# Patient Record
Sex: MALE | Race: WHITE | NOT HISPANIC OR LATINO | Employment: OTHER | URBAN - METROPOLITAN AREA
[De-identification: names, ages, dates, MRNs, and addresses within clinical notes are randomized per-mention and may not be internally consistent; named-entity substitution may affect disease eponyms.]

---

## 2023-08-07 ENCOUNTER — HOSPITAL ENCOUNTER (OUTPATIENT)
Dept: DATA CONVERSION | Facility: HOSPITAL | Age: 83
End: 2023-08-07
Attending: NEUROLOGICAL SURGERY | Admitting: NEUROLOGICAL SURGERY
Payer: MEDICARE

## 2023-08-07 DIAGNOSIS — M48.062 SPINAL STENOSIS, LUMBAR REGION WITH NEUROGENIC CLAUDICATION: ICD-10-CM

## 2023-08-11 DIAGNOSIS — M48.061 SPINAL STENOSIS OF LUMBAR REGION AT MULTIPLE LEVELS: Primary | ICD-10-CM

## 2023-08-14 ENCOUNTER — OFFICE VISIT (OUTPATIENT)
Dept: FAMILY MEDICINE CLINIC | Age: 83
End: 2023-08-14
Payer: MEDICARE

## 2023-08-14 VITALS
TEMPERATURE: 98.8 F | HEIGHT: 72 IN | DIASTOLIC BLOOD PRESSURE: 84 MMHG | WEIGHT: 210 LBS | OXYGEN SATURATION: 90 % | SYSTOLIC BLOOD PRESSURE: 134 MMHG | HEART RATE: 103 BPM | BODY MASS INDEX: 28.44 KG/M2

## 2023-08-14 DIAGNOSIS — E27.1 ADDISON'S DISEASE (HCC): ICD-10-CM

## 2023-08-14 DIAGNOSIS — J84.9 INTERSTITIAL LUNG DISEASE (HCC): ICD-10-CM

## 2023-08-14 DIAGNOSIS — E03.8 OTHER SPECIFIED HYPOTHYROIDISM: ICD-10-CM

## 2023-08-14 DIAGNOSIS — F33.1 MODERATE RECURRENT MAJOR DEPRESSION (HCC): ICD-10-CM

## 2023-08-14 DIAGNOSIS — F41.1 GENERALIZED ANXIETY DISORDER: ICD-10-CM

## 2023-08-14 DIAGNOSIS — M1A.09X0 CHRONIC GOUT OF MULTIPLE SITES, UNSPECIFIED CAUSE: ICD-10-CM

## 2023-08-14 DIAGNOSIS — M48.061 SPINAL STENOSIS OF LUMBAR REGION AT MULTIPLE LEVELS: Primary | ICD-10-CM

## 2023-08-14 PROCEDURE — 1123F ACP DISCUSS/DSCN MKR DOCD: CPT | Performed by: FAMILY MEDICINE

## 2023-08-14 PROCEDURE — 99214 OFFICE O/P EST MOD 30 MIN: CPT | Performed by: FAMILY MEDICINE

## 2023-08-14 RX ORDER — ALLOPURINOL 300 MG/1
TABLET ORAL
COMMUNITY
Start: 2023-05-17

## 2023-08-14 RX ORDER — DICLOFENAC SODIUM 75 MG/1
TABLET, DELAYED RELEASE ORAL
COMMUNITY
Start: 2023-05-17

## 2023-08-14 RX ORDER — MORPHINE SULFATE 30 MG/1
30 TABLET, FILM COATED, EXTENDED RELEASE ORAL EVERY 12 HOURS
Qty: 60 TABLET | Refills: 0 | Status: SHIPPED | OUTPATIENT
Start: 2023-08-14 | End: 2023-08-15 | Stop reason: SDUPTHER

## 2023-08-14 RX ORDER — B-COMPLEX WITH VITAMIN C
500 TABLET ORAL DAILY
COMMUNITY

## 2023-08-14 RX ORDER — METOPROLOL SUCCINATE 25 MG/1
TABLET, EXTENDED RELEASE ORAL
COMMUNITY
Start: 2023-06-02 | End: 2023-08-14 | Stop reason: ALTCHOICE

## 2023-08-14 RX ORDER — POLYETHYLENE GLYCOL 3350 17 G/17G
17 POWDER, FOR SOLUTION ORAL DAILY PRN
COMMUNITY

## 2023-08-14 RX ORDER — LEVOTHYROXINE SODIUM 150 MCG
TABLET ORAL
COMMUNITY
Start: 2023-07-20

## 2023-08-14 RX ORDER — ALBUTEROL SULFATE 2.5 MG/3ML
SOLUTION RESPIRATORY (INHALATION)
COMMUNITY
Start: 2023-06-21

## 2023-08-14 RX ORDER — POTASSIUM CHLORIDE 750 MG/1
CAPSULE, EXTENDED RELEASE ORAL
COMMUNITY
Start: 2023-05-17

## 2023-08-14 RX ORDER — ELECTROLYTES/DEXTROSE
SOLUTION, ORAL ORAL NIGHTLY
COMMUNITY

## 2023-08-14 RX ORDER — HYDROXYZINE HYDROCHLORIDE 25 MG/1
TABLET, FILM COATED ORAL
COMMUNITY
Start: 2023-08-08

## 2023-08-14 RX ORDER — MORPHINE SULFATE 30 MG/1
30 TABLET, FILM COATED, EXTENDED RELEASE ORAL EVERY 12 HOURS
COMMUNITY
Start: 2023-07-21 | End: 2023-08-14 | Stop reason: SDUPTHER

## 2023-08-14 RX ORDER — CYCLOBENZAPRINE HCL 10 MG
TABLET ORAL
COMMUNITY
Start: 2023-06-29 | End: 2023-08-14 | Stop reason: SDUPTHER

## 2023-08-14 RX ORDER — OMEPRAZOLE 20 MG/1
20 CAPSULE, DELAYED RELEASE ORAL DAILY
COMMUNITY

## 2023-08-14 RX ORDER — PREGABALIN 50 MG/1
CAPSULE ORAL
COMMUNITY
Start: 2023-07-25

## 2023-08-14 RX ORDER — TAMSULOSIN HYDROCHLORIDE 0.4 MG/1
0.4 CAPSULE ORAL 2 TIMES DAILY
COMMUNITY
Start: 2023-07-20

## 2023-08-14 RX ORDER — CYCLOBENZAPRINE HCL 10 MG
TABLET ORAL
Qty: 30 TABLET | Refills: 3 | Status: SHIPPED | OUTPATIENT
Start: 2023-08-14

## 2023-08-14 RX ORDER — DULOXETIN HYDROCHLORIDE 60 MG/1
CAPSULE, DELAYED RELEASE ORAL
COMMUNITY
Start: 2023-06-05

## 2023-08-14 RX ORDER — HYDROCORTISONE 10 MG/1
TABLET ORAL
COMMUNITY
Start: 2023-06-28

## 2023-08-14 RX ORDER — GUANFACINE 2 MG/1
1 TABLET ORAL NIGHTLY
COMMUNITY
Start: 2023-07-20

## 2023-08-14 RX ORDER — FUROSEMIDE 40 MG/1
TABLET ORAL
COMMUNITY
Start: 2023-06-28

## 2023-08-14 RX ORDER — SENNA AND DOCUSATE SODIUM 50; 8.6 MG/1; MG/1
1 TABLET, FILM COATED ORAL DAILY
COMMUNITY

## 2023-08-14 SDOH — ECONOMIC STABILITY: HOUSING INSECURITY
IN THE LAST 12 MONTHS, WAS THERE A TIME WHEN YOU DID NOT HAVE A STEADY PLACE TO SLEEP OR SLEPT IN A SHELTER (INCLUDING NOW)?: NO

## 2023-08-14 SDOH — ECONOMIC STABILITY: FOOD INSECURITY: WITHIN THE PAST 12 MONTHS, THE FOOD YOU BOUGHT JUST DIDN'T LAST AND YOU DIDN'T HAVE MONEY TO GET MORE.: NEVER TRUE

## 2023-08-14 SDOH — ECONOMIC STABILITY: FOOD INSECURITY: WITHIN THE PAST 12 MONTHS, YOU WORRIED THAT YOUR FOOD WOULD RUN OUT BEFORE YOU GOT MONEY TO BUY MORE.: NEVER TRUE

## 2023-08-14 SDOH — ECONOMIC STABILITY: INCOME INSECURITY: HOW HARD IS IT FOR YOU TO PAY FOR THE VERY BASICS LIKE FOOD, HOUSING, MEDICAL CARE, AND HEATING?: NOT HARD AT ALL

## 2023-08-14 ASSESSMENT — PATIENT HEALTH QUESTIONNAIRE - PHQ9
SUM OF ALL RESPONSES TO PHQ QUESTIONS 1-9: 0
7. TROUBLE CONCENTRATING ON THINGS, SUCH AS READING THE NEWSPAPER OR WATCHING TELEVISION: 0
2. FEELING DOWN, DEPRESSED OR HOPELESS: 0
9. THOUGHTS THAT YOU WOULD BE BETTER OFF DEAD, OR OF HURTING YOURSELF: 0
SUM OF ALL RESPONSES TO PHQ QUESTIONS 1-9: 0
SUM OF ALL RESPONSES TO PHQ9 QUESTIONS 1 & 2: 0
SUM OF ALL RESPONSES TO PHQ QUESTIONS 1-9: 0
6. FEELING BAD ABOUT YOURSELF - OR THAT YOU ARE A FAILURE OR HAVE LET YOURSELF OR YOUR FAMILY DOWN: 0
10. IF YOU CHECKED OFF ANY PROBLEMS, HOW DIFFICULT HAVE THESE PROBLEMS MADE IT FOR YOU TO DO YOUR WORK, TAKE CARE OF THINGS AT HOME, OR GET ALONG WITH OTHER PEOPLE: 0
SUM OF ALL RESPONSES TO PHQ QUESTIONS 1-9: 0
1. LITTLE INTEREST OR PLEASURE IN DOING THINGS: 0
4. FEELING TIRED OR HAVING LITTLE ENERGY: 0
3. TROUBLE FALLING OR STAYING ASLEEP: 0
5. POOR APPETITE OR OVEREATING: 0
8. MOVING OR SPEAKING SO SLOWLY THAT OTHER PEOPLE COULD HAVE NOTICED. OR THE OPPOSITE, BEING SO FIGETY OR RESTLESS THAT YOU HAVE BEEN MOVING AROUND A LOT MORE THAN USUAL: 0

## 2023-08-14 NOTE — PROGRESS NOTES
morphine (MS CONTIN) 30 MG extended release tablet; Take 1 tablet by mouth in the morning and 1 tablet in the evening. Do all this for 30 days. Max Daily Amount: 60 mg., Disp-60 tablet, R-0Normal  2. [place]'s disease (HCC)-they may want to get his endocrine neurologist's opinion on whether or not he needs to stress doses hydrocortisone postoperatively. 3. Interstitial lung disease (HCC)-well-controlled. His lungs sound excellent. 4. Moderate recurrent major depression (HCC)-moods are quite stable. Continue the duloxetine. 5. Chronic gout of multiple sites, unspecified cause  6. Generalized anxiety disorder  7. Other specified hypothyroidism     No orders of the defined types were placed in this encounter. Orders Placed This Encounter   Medications    cyclobenzaprine (FLEXERIL) 10 MG tablet     Sig: TAKE 1 TABLET BY MOUTH THREE TIMES DAILY AS NEEDED for spasm     Dispense:  30 tablet     Refill:  3    morphine (MS CONTIN) 30 MG extended release tablet     Sig: Take 1 tablet by mouth in the morning and 1 tablet in the evening. Do all this for 30 days. Max Daily Amount: 60 mg. Dispense:  60 tablet     Refill:  0     DNF until 8/21/23     Medications Discontinued During This Encounter   Medication Reason    metoprolol succinate (TOPROL XL) 25 MG extended release tablet Therapy completed    nystatin (MYCOSTATIN) 525282 UNIT/ML suspension Therapy completed    cyclobenzaprine (FLEXERIL) 10 MG tablet REORDER    morphine (MS CONTIN) 30 MG extended release tablet REORDER     Return in about 3 months (around 11/14/2023). Reviewed with the patient: current clinical status, medications, activities and diet. Side effects, adverse effects of the medication prescribed today, as well as treatment plan/ rationale and result expectations have been discussed with the patient who expresses understanding and desires to proceed. Close follow up to evaluate treatment results and for coordination of care.   I have

## 2023-08-15 DIAGNOSIS — M48.061 SPINAL STENOSIS OF LUMBAR REGION AT MULTIPLE LEVELS: ICD-10-CM

## 2023-08-15 RX ORDER — MORPHINE SULFATE 30 MG/1
30 TABLET, FILM COATED, EXTENDED RELEASE ORAL EVERY 12 HOURS
Qty: 60 TABLET | Refills: 0 | Status: SHIPPED | OUTPATIENT
Start: 2023-08-15 | End: 2023-09-14

## 2023-08-15 NOTE — TELEPHONE ENCOUNTER
Patient's wife, Brennan Kim (ON HIPPA) is asking that the pended prescription be sent to [facility] in [place]. It was sent to [facility] and the will not fill prescription.

## 2023-09-07 RX ORDER — POTASSIUM CHLORIDE 750 MG/1
10 CAPSULE, EXTENDED RELEASE ORAL 2 TIMES DAILY
Qty: 180 CAPSULE | Refills: 1 | Status: SHIPPED | OUTPATIENT
Start: 2023-09-07

## 2023-09-07 NOTE — TELEPHONE ENCOUNTER
MEDICATION REFILL REQUEST     Rx Requested    Requested Prescriptions     Pending Prescriptions Disp Refills    potassium chloride (MICRO-K) 10 MEQ extended release capsule 60 capsule          Patient's Last Office Visit   8/14/2023      Patient's Next Office Visit   Future Appointments   Date Time Provider [facility] [address]   11/13/2023  1:30 PM [place] [address] [place] [address]         Other comments

## 2023-09-18 DIAGNOSIS — M48.061 SPINAL STENOSIS OF LUMBAR REGION AT MULTIPLE LEVELS: ICD-10-CM

## 2023-09-18 RX ORDER — MORPHINE SULFATE 30 MG/1
30 TABLET, FILM COATED, EXTENDED RELEASE ORAL EVERY 12 HOURS
Qty: 60 TABLET | Refills: 0 | Status: SHIPPED | OUTPATIENT
Start: 2023-09-18 | End: 2023-10-18

## 2023-09-18 NOTE — TELEPHONE ENCOUNTER
Wife requesting medication refill. Please approve or deny this request.    Rx requested:  Requested Prescriptions     Pending Prescriptions Disp Refills    morphine (MS CONTIN) 30 MG extended release tablet 60 tablet 0     Sig: Take 1 tablet by mouth in the morning and 1 tablet in the evening. Do all this for 30 days. Max Daily Amount: 60 mg.          Last Office Visit:   8/14/2023      Next Visit Date:  Future Appointments   Date Time Provider [address] [place] [address]   11/13/2023  1:30 PM [facility]

## 2023-09-30 ENCOUNTER — ANESTHESIA EVENT (OUTPATIENT)
Dept: OPERATING ROOM | Facility: HOSPITAL | Age: 83
End: 2023-09-30
Payer: MEDICARE

## 2023-09-30 RX ORDER — DULOXETIN HYDROCHLORIDE 60 MG/1
60 CAPSULE, DELAYED RELEASE ORAL DAILY
COMMUNITY

## 2023-09-30 RX ORDER — ALLOPURINOL 300 MG/1
300 TABLET ORAL DAILY
COMMUNITY

## 2023-09-30 RX ORDER — OMEPRAZOLE 20 MG/1
20 CAPSULE, DELAYED RELEASE ORAL
COMMUNITY
End: 2023-10-19 | Stop reason: ALTCHOICE

## 2023-09-30 RX ORDER — CYCLOBENZAPRINE HCL 10 MG
10 TABLET ORAL 3 TIMES DAILY PRN
COMMUNITY

## 2023-09-30 RX ORDER — POTASSIUM CHLORIDE 750 MG/1
10 CAPSULE, EXTENDED RELEASE ORAL 2 TIMES DAILY
COMMUNITY

## 2023-09-30 RX ORDER — PREGABALIN 50 MG/1
50 CAPSULE ORAL 3 TIMES DAILY
COMMUNITY

## 2023-09-30 RX ORDER — FERROUS SULFATE, DRIED 160(50) MG
1 TABLET, EXTENDED RELEASE ORAL DAILY
COMMUNITY

## 2023-09-30 RX ORDER — FUROSEMIDE 40 MG/1
TABLET ORAL
COMMUNITY

## 2023-09-30 RX ORDER — IRON POLYSACCHARIDE COMPLEX 150 MG
150 CAPSULE ORAL DAILY
COMMUNITY

## 2023-09-30 RX ORDER — LEVOTHYROXINE SODIUM 150 UG/1
150 TABLET ORAL
COMMUNITY

## 2023-09-30 RX ORDER — HYDROCORTISONE 10 MG/1
TABLET ORAL DAILY
COMMUNITY

## 2023-09-30 RX ORDER — HYDRALAZINE HYDROCHLORIDE 10 MG/1
10 TABLET, FILM COATED ORAL 3 TIMES DAILY
COMMUNITY
End: 2023-10-19 | Stop reason: ALTCHOICE

## 2023-09-30 RX ORDER — GUANFACINE 2 MG/1
TABLET ORAL
COMMUNITY
End: 2023-10-19 | Stop reason: ALTCHOICE

## 2023-10-02 ENCOUNTER — APPOINTMENT (OUTPATIENT)
Dept: RADIOLOGY | Facility: HOSPITAL | Age: 83
End: 2023-10-02
Payer: MEDICARE

## 2023-10-02 ENCOUNTER — ANESTHESIA (OUTPATIENT)
Dept: OPERATING ROOM | Facility: HOSPITAL | Age: 83
End: 2023-10-02
Payer: MEDICARE

## 2023-10-02 ENCOUNTER — HOSPITAL ENCOUNTER (OUTPATIENT)
Facility: HOSPITAL | Age: 83
Discharge: NURSING, REHAB OR LONG-TERM CARE | End: 2023-10-07
Attending: NEUROLOGICAL SURGERY | Admitting: NEUROLOGICAL SURGERY
Payer: MEDICARE

## 2023-10-02 DIAGNOSIS — M51.37 DEGENERATION OF LUMBAR OR LUMBOSACRAL INTERVERTEBRAL DISC: Primary | ICD-10-CM

## 2023-10-02 PROBLEM — M48.062 PSEUDOCLAUDICATION SYNDROME: Status: ACTIVE | Noted: 2023-10-02

## 2023-10-02 PROBLEM — F33.1 MODERATE RECURRENT MAJOR DEPRESSION (MULTI): Status: ACTIVE | Noted: 2023-08-14

## 2023-10-02 PROBLEM — E27.1 ADDISON'S DISEASE (MULTI): Status: ACTIVE | Noted: 2023-08-14

## 2023-10-02 PROBLEM — F41.1 GENERALIZED ANXIETY DISORDER: Status: ACTIVE | Noted: 2023-08-14

## 2023-10-02 PROCEDURE — 7100000001 HC RECOVERY ROOM TIME - INITIAL BASE CHARGE: Performed by: NEUROLOGICAL SURGERY

## 2023-10-02 PROCEDURE — 2500000005 HC RX 250 GENERAL PHARMACY W/O HCPCS: Performed by: NEUROLOGICAL SURGERY

## 2023-10-02 PROCEDURE — 63047 LAM FACETEC & FORAMOT LUMBAR: CPT | Performed by: NEUROLOGICAL SURGERY

## 2023-10-02 PROCEDURE — 2500000004 HC RX 250 GENERAL PHARMACY W/ HCPCS (ALT 636 FOR OP/ED): Performed by: NEUROLOGICAL SURGERY

## 2023-10-02 PROCEDURE — 3600000009 HC OR TIME - EACH INCREMENTAL 1 MINUTE - PROCEDURE LEVEL FOUR: Performed by: NEUROLOGICAL SURGERY

## 2023-10-02 PROCEDURE — 2500000004 HC RX 250 GENERAL PHARMACY W/ HCPCS (ALT 636 FOR OP/ED): Performed by: CLINICAL NURSE SPECIALIST

## 2023-10-02 PROCEDURE — 2500000005 HC RX 250 GENERAL PHARMACY W/O HCPCS: Performed by: ANESTHESIOLOGY

## 2023-10-02 PROCEDURE — 2500000005 HC RX 250 GENERAL PHARMACY W/O HCPCS: Performed by: ANESTHESIOLOGIST ASSISTANT

## 2023-10-02 PROCEDURE — 2780000003 HC OR 278 NO HCPCS: Performed by: NEUROLOGICAL SURGERY

## 2023-10-02 PROCEDURE — A63047 PR LAMINEC/FACETECT/FORAMIN,LUMBAR 1 SEG: Performed by: ANESTHESIOLOGY

## 2023-10-02 PROCEDURE — 2720000007 HC OR 272 NO HCPCS: Performed by: NEUROLOGICAL SURGERY

## 2023-10-02 PROCEDURE — 70120 X-RAY EXAM OF MASTOIDS: CPT

## 2023-10-02 PROCEDURE — 2500000001 HC RX 250 WO HCPCS SELF ADMINISTERED DRUGS (ALT 637 FOR MEDICARE OP): Performed by: CLINICAL NURSE SPECIALIST

## 2023-10-02 PROCEDURE — 72020 X-RAY EXAM OF SPINE 1 VIEW: CPT

## 2023-10-02 PROCEDURE — 2500000004 HC RX 250 GENERAL PHARMACY W/ HCPCS (ALT 636 FOR OP/ED): Performed by: STUDENT IN AN ORGANIZED HEALTH CARE EDUCATION/TRAINING PROGRAM

## 2023-10-02 PROCEDURE — 7100000002 HC RECOVERY ROOM TIME - EACH INCREMENTAL 1 MINUTE: Performed by: NEUROLOGICAL SURGERY

## 2023-10-02 PROCEDURE — 2580000001 HC RX 258 IV SOLUTIONS: Performed by: ANESTHESIOLOGY

## 2023-10-02 PROCEDURE — 2500000005 HC RX 250 GENERAL PHARMACY W/O HCPCS

## 2023-10-02 PROCEDURE — 7100000011 HC EXTENDED STAY RECOVERY HOURLY - NURSING UNIT

## 2023-10-02 PROCEDURE — 2500000004 HC RX 250 GENERAL PHARMACY W/ HCPCS (ALT 636 FOR OP/ED): Performed by: ANESTHESIOLOGIST ASSISTANT

## 2023-10-02 PROCEDURE — C1751 CATH, INF, PER/CENT/MIDLINE: HCPCS | Performed by: NEUROLOGICAL SURGERY

## 2023-10-02 PROCEDURE — 3700000002 HC GENERAL ANESTHESIA TIME - EACH INCREMENTAL 1 MINUTE: Performed by: NEUROLOGICAL SURGERY

## 2023-10-02 PROCEDURE — 99100 ANES PT EXTEME AGE<1 YR&>70: CPT | Performed by: ANESTHESIOLOGY

## 2023-10-02 PROCEDURE — 2500000001 HC RX 250 WO HCPCS SELF ADMINISTERED DRUGS (ALT 637 FOR MEDICARE OP): Performed by: NEUROLOGICAL SURGERY

## 2023-10-02 PROCEDURE — 3600000004 HC OR TIME - INITIAL BASE CHARGE - PROCEDURE LEVEL FOUR: Performed by: NEUROLOGICAL SURGERY

## 2023-10-02 PROCEDURE — 2500000004 HC RX 250 GENERAL PHARMACY W/ HCPCS (ALT 636 FOR OP/ED)

## 2023-10-02 PROCEDURE — 2580000001 HC RX 258 IV SOLUTIONS: Performed by: ANESTHESIOLOGIST ASSISTANT

## 2023-10-02 PROCEDURE — 63048 LAM FACETEC &FORAMOT EA ADDL: CPT | Performed by: NEUROLOGICAL SURGERY

## 2023-10-02 PROCEDURE — A4217 STERILE WATER/SALINE, 500 ML: HCPCS

## 2023-10-02 PROCEDURE — 3700000001 HC GENERAL ANESTHESIA TIME - INITIAL BASE CHARGE: Performed by: NEUROLOGICAL SURGERY

## 2023-10-02 PROCEDURE — A4217 STERILE WATER/SALINE, 500 ML: HCPCS | Performed by: NEUROLOGICAL SURGERY

## 2023-10-02 PROCEDURE — 2500000004 HC RX 250 GENERAL PHARMACY W/ HCPCS (ALT 636 FOR OP/ED): Performed by: ANESTHESIOLOGY

## 2023-10-02 PROCEDURE — 36620 INSERTION CATHETER ARTERY: CPT | Performed by: ANESTHESIOLOGIST ASSISTANT

## 2023-10-02 PROCEDURE — A63047 PR LAMINEC/FACETECT/FORAMIN,LUMBAR 1 SEG: Performed by: ANESTHESIOLOGIST ASSISTANT

## 2023-10-02 RX ORDER — HYDRALAZINE HYDROCHLORIDE 20 MG/ML
5 INJECTION INTRAMUSCULAR; INTRAVENOUS EVERY 30 MIN PRN
Status: DISCONTINUED | OUTPATIENT
Start: 2023-10-02 | End: 2023-10-02 | Stop reason: HOSPADM

## 2023-10-02 RX ORDER — ONDANSETRON 4 MG/1
4 TABLET, ORALLY DISINTEGRATING ORAL EVERY 8 HOURS PRN
Status: DISCONTINUED | OUTPATIENT
Start: 2023-10-02 | End: 2023-10-06

## 2023-10-02 RX ORDER — ONDANSETRON HYDROCHLORIDE 2 MG/ML
INJECTION, SOLUTION INTRAVENOUS AS NEEDED
Status: DISCONTINUED | OUTPATIENT
Start: 2023-10-02 | End: 2023-10-02

## 2023-10-02 RX ORDER — SODIUM CHLORIDE 0.9 G/100ML
IRRIGANT IRRIGATION AS NEEDED
Status: DISCONTINUED | OUTPATIENT
Start: 2023-10-02 | End: 2023-10-02 | Stop reason: HOSPADM

## 2023-10-02 RX ORDER — BUPIVACAINE HYDROCHLORIDE 5 MG/ML
INJECTION, SOLUTION PERINEURAL AS NEEDED
Status: DISCONTINUED | OUTPATIENT
Start: 2023-10-02 | End: 2023-10-02 | Stop reason: HOSPADM

## 2023-10-02 RX ORDER — DOCUSATE SODIUM 100 MG/1
100 CAPSULE, LIQUID FILLED ORAL 2 TIMES DAILY
Status: DISCONTINUED | OUTPATIENT
Start: 2023-10-02 | End: 2023-10-06

## 2023-10-02 RX ORDER — POTASSIUM CHLORIDE 20 MEQ/1
20 TABLET, EXTENDED RELEASE ORAL DAILY
Status: DISCONTINUED | OUTPATIENT
Start: 2023-10-02 | End: 2023-10-07 | Stop reason: HOSPADM

## 2023-10-02 RX ORDER — FAMOTIDINE 10 MG/ML
20 INJECTION INTRAVENOUS AS NEEDED
Status: DISCONTINUED | OUTPATIENT
Start: 2023-10-02 | End: 2023-10-07 | Stop reason: HOSPADM

## 2023-10-02 RX ORDER — SODIUM CHLORIDE 9 MG/ML
100 INJECTION, SOLUTION INTRAVENOUS CONTINUOUS
Status: DISCONTINUED | OUTPATIENT
Start: 2023-10-02 | End: 2023-10-06

## 2023-10-02 RX ORDER — ONDANSETRON HYDROCHLORIDE 2 MG/ML
4 INJECTION, SOLUTION INTRAVENOUS ONCE AS NEEDED
Status: DISCONTINUED | OUTPATIENT
Start: 2023-10-02 | End: 2023-10-02 | Stop reason: HOSPADM

## 2023-10-02 RX ORDER — PROMETHAZINE HYDROCHLORIDE 50 MG/ML
12.5 INJECTION, SOLUTION INTRAMUSCULAR; INTRAVENOUS ONCE AS NEEDED
Status: DISCONTINUED | OUTPATIENT
Start: 2023-10-02 | End: 2023-10-02 | Stop reason: HOSPADM

## 2023-10-02 RX ORDER — ALLOPURINOL 300 MG/1
300 TABLET ORAL DAILY
Status: DISCONTINUED | OUTPATIENT
Start: 2023-10-02 | End: 2023-10-07 | Stop reason: HOSPADM

## 2023-10-02 RX ORDER — SODIUM CHLORIDE, SODIUM LACTATE, POTASSIUM CHLORIDE, CALCIUM CHLORIDE 600; 310; 30; 20 MG/100ML; MG/100ML; MG/100ML; MG/100ML
100 INJECTION, SOLUTION INTRAVENOUS CONTINUOUS
Status: DISCONTINUED | OUTPATIENT
Start: 2023-10-02 | End: 2023-10-02 | Stop reason: HOSPADM

## 2023-10-02 RX ORDER — PROPOFOL 10 MG/ML
INJECTION, EMULSION INTRAVENOUS AS NEEDED
Status: DISCONTINUED | OUTPATIENT
Start: 2023-10-02 | End: 2023-10-02

## 2023-10-02 RX ORDER — LIDOCAINE HCL/PF 100 MG/5ML
SYRINGE (ML) INTRAVENOUS AS NEEDED
Status: DISCONTINUED | OUTPATIENT
Start: 2023-10-02 | End: 2023-10-02

## 2023-10-02 RX ORDER — MIDAZOLAM HYDROCHLORIDE 1 MG/ML
1 INJECTION, SOLUTION INTRAMUSCULAR; INTRAVENOUS ONCE AS NEEDED
Status: DISCONTINUED | OUTPATIENT
Start: 2023-10-02 | End: 2023-10-02 | Stop reason: HOSPADM

## 2023-10-02 RX ORDER — ALBUTEROL SULFATE 0.83 MG/ML
2.5 SOLUTION RESPIRATORY (INHALATION) AS NEEDED
Status: DISCONTINUED | OUTPATIENT
Start: 2023-10-02 | End: 2023-10-07 | Stop reason: HOSPADM

## 2023-10-02 RX ORDER — ACETAMINOPHEN 325 MG/1
650 TABLET ORAL EVERY 4 HOURS PRN
Status: DISCONTINUED | OUTPATIENT
Start: 2023-10-02 | End: 2023-10-02 | Stop reason: HOSPADM

## 2023-10-02 RX ORDER — PREGABALIN 50 MG/1
50 CAPSULE ORAL 3 TIMES DAILY
Status: DISCONTINUED | OUTPATIENT
Start: 2023-10-02 | End: 2023-10-07 | Stop reason: HOSPADM

## 2023-10-02 RX ORDER — SODIUM CHLORIDE, SODIUM LACTATE, POTASSIUM CHLORIDE, CALCIUM CHLORIDE 600; 310; 30; 20 MG/100ML; MG/100ML; MG/100ML; MG/100ML
75 INJECTION, SOLUTION INTRAVENOUS CONTINUOUS
Status: DISCONTINUED | OUTPATIENT
Start: 2023-10-02 | End: 2023-10-06

## 2023-10-02 RX ORDER — DULOXETIN HYDROCHLORIDE 30 MG/1
60 CAPSULE, DELAYED RELEASE ORAL DAILY
Status: DISCONTINUED | OUTPATIENT
Start: 2023-10-02 | End: 2023-10-07 | Stop reason: HOSPADM

## 2023-10-02 RX ORDER — HYDRALAZINE HYDROCHLORIDE 10 MG/1
10 TABLET, FILM COATED ORAL 3 TIMES DAILY
Status: DISCONTINUED | OUTPATIENT
Start: 2023-10-02 | End: 2023-10-07 | Stop reason: HOSPADM

## 2023-10-02 RX ORDER — ONDANSETRON HYDROCHLORIDE 2 MG/ML
4 INJECTION, SOLUTION INTRAVENOUS EVERY 8 HOURS PRN
Status: DISCONTINUED | OUTPATIENT
Start: 2023-10-02 | End: 2023-10-06

## 2023-10-02 RX ORDER — PROPOFOL 10 MG/ML
INJECTION, EMULSION INTRAVENOUS CONTINUOUS PRN
Status: DISCONTINUED | OUTPATIENT
Start: 2023-10-02 | End: 2023-10-02

## 2023-10-02 RX ORDER — MIDAZOLAM HYDROCHLORIDE 1 MG/ML
INJECTION, SOLUTION INTRAMUSCULAR; INTRAVENOUS AS NEEDED
Status: DISCONTINUED | OUTPATIENT
Start: 2023-10-02 | End: 2023-10-02

## 2023-10-02 RX ORDER — CEFAZOLIN SODIUM 2 G/100ML
2 INJECTION, SOLUTION INTRAVENOUS EVERY 8 HOURS
Status: COMPLETED | OUTPATIENT
Start: 2023-10-02 | End: 2023-10-03

## 2023-10-02 RX ORDER — PHENYLEPHRINE HCL IN 0.9% NACL 1 MG/10 ML
SYRINGE (ML) INTRAVENOUS AS NEEDED
Status: DISCONTINUED | OUTPATIENT
Start: 2023-10-02 | End: 2023-10-02

## 2023-10-02 RX ORDER — LEVOTHYROXINE SODIUM 150 UG/1
150 TABLET ORAL
Status: DISCONTINUED | OUTPATIENT
Start: 2023-10-03 | End: 2023-10-07 | Stop reason: HOSPADM

## 2023-10-02 RX ORDER — MORPHINE SULFATE 2 MG/ML
2 INJECTION, SOLUTION INTRAMUSCULAR; INTRAVENOUS EVERY 2 HOUR PRN
Status: DISCONTINUED | OUTPATIENT
Start: 2023-10-02 | End: 2023-10-06

## 2023-10-02 RX ORDER — FENTANYL CITRATE 50 UG/ML
INJECTION, SOLUTION INTRAMUSCULAR; INTRAVENOUS AS NEEDED
Status: DISCONTINUED | OUTPATIENT
Start: 2023-10-02 | End: 2023-10-02

## 2023-10-02 RX ORDER — CYCLOBENZAPRINE HCL 10 MG
10 TABLET ORAL 3 TIMES DAILY PRN
Status: DISCONTINUED | OUTPATIENT
Start: 2023-10-02 | End: 2023-10-06

## 2023-10-02 RX ORDER — METHYLPREDNISOLONE ACETATE 40 MG/ML
INJECTION, SUSPENSION INTRA-ARTICULAR; INTRALESIONAL; INTRAMUSCULAR; SOFT TISSUE AS NEEDED
Status: DISCONTINUED | OUTPATIENT
Start: 2023-10-02 | End: 2023-10-02 | Stop reason: HOSPADM

## 2023-10-02 RX ORDER — SCOLOPAMINE TRANSDERMAL SYSTEM 1 MG/1
1 PATCH, EXTENDED RELEASE TRANSDERMAL AS NEEDED
Status: DISCONTINUED | OUTPATIENT
Start: 2023-10-02 | End: 2023-10-06

## 2023-10-02 RX ORDER — OXYCODONE HYDROCHLORIDE 5 MG/1
5 TABLET ORAL EVERY 4 HOURS PRN
Status: DISCONTINUED | OUTPATIENT
Start: 2023-10-02 | End: 2023-10-06

## 2023-10-02 RX ORDER — MEPERIDINE HYDROCHLORIDE 25 MG/ML
12.5 INJECTION INTRAMUSCULAR; INTRAVENOUS; SUBCUTANEOUS EVERY 10 MIN PRN
Status: DISCONTINUED | OUTPATIENT
Start: 2023-10-02 | End: 2023-10-02 | Stop reason: HOSPADM

## 2023-10-02 RX ORDER — DIPHENHYDRAMINE HYDROCHLORIDE 50 MG/ML
12.5 INJECTION INTRAMUSCULAR; INTRAVENOUS ONCE AS NEEDED
Status: DISCONTINUED | OUTPATIENT
Start: 2023-10-02 | End: 2023-10-02 | Stop reason: HOSPADM

## 2023-10-02 RX ORDER — HYDROMORPHONE HYDROCHLORIDE 1 MG/ML
1 INJECTION, SOLUTION INTRAMUSCULAR; INTRAVENOUS; SUBCUTANEOUS EVERY 5 MIN PRN
Status: DISCONTINUED | OUTPATIENT
Start: 2023-10-02 | End: 2023-10-02 | Stop reason: HOSPADM

## 2023-10-02 RX ORDER — ONDANSETRON HYDROCHLORIDE 2 MG/ML
4 INJECTION, SOLUTION INTRAVENOUS AS NEEDED
Status: DISCONTINUED | OUTPATIENT
Start: 2023-10-02 | End: 2023-10-06

## 2023-10-02 RX ORDER — PHENYLEPHRINE 10 MG/250 ML(40 MCG/ML)IN 0.9 % SOD.CHLORIDE INTRAVENOUS
CONTINUOUS PRN
Status: DISCONTINUED | OUTPATIENT
Start: 2023-10-02 | End: 2023-10-02

## 2023-10-02 RX ORDER — NALOXONE HYDROCHLORIDE 1 MG/ML
0.2 INJECTION INTRAMUSCULAR; INTRAVENOUS; SUBCUTANEOUS EVERY 5 MIN PRN
Status: DISCONTINUED | OUTPATIENT
Start: 2023-10-02 | End: 2023-10-06

## 2023-10-02 RX ORDER — ROCURONIUM BROMIDE 10 MG/ML
INJECTION, SOLUTION INTRAVENOUS AS NEEDED
Status: DISCONTINUED | OUTPATIENT
Start: 2023-10-02 | End: 2023-10-02

## 2023-10-02 RX ORDER — CEFAZOLIN SODIUM 2 G/100ML
2 INJECTION, SOLUTION INTRAVENOUS ONCE
Status: COMPLETED | OUTPATIENT
Start: 2023-10-02 | End: 2023-10-02

## 2023-10-02 RX ORDER — HYDROMORPHONE HYDROCHLORIDE 2 MG/ML
INJECTION, SOLUTION INTRAMUSCULAR; INTRAVENOUS; SUBCUTANEOUS AS NEEDED
Status: DISCONTINUED | OUTPATIENT
Start: 2023-10-02 | End: 2023-10-02

## 2023-10-02 RX ORDER — PANTOPRAZOLE SODIUM 40 MG/1
40 TABLET, DELAYED RELEASE ORAL
Status: DISCONTINUED | OUTPATIENT
Start: 2023-10-03 | End: 2023-10-07 | Stop reason: HOSPADM

## 2023-10-02 RX ORDER — OXYCODONE HYDROCHLORIDE 5 MG/1
2.5 TABLET ORAL EVERY 4 HOURS PRN
Status: DISCONTINUED | OUTPATIENT
Start: 2023-10-02 | End: 2023-10-07 | Stop reason: HOSPADM

## 2023-10-02 RX ADMIN — CEFAZOLIN SODIUM 2 G: 2 INJECTION, SOLUTION INTRAVENOUS at 07:54

## 2023-10-02 RX ADMIN — POTASSIUM CHLORIDE 20 MEQ: 1500 TABLET, EXTENDED RELEASE ORAL at 17:24

## 2023-10-02 RX ADMIN — CEFAZOLIN SODIUM 2 G: 2 INJECTION, SOLUTION INTRAVENOUS at 18:07

## 2023-10-02 RX ADMIN — FENTANYL CITRATE 50 MCG: 50 INJECTION, SOLUTION INTRAMUSCULAR; INTRAVENOUS at 12:02

## 2023-10-02 RX ADMIN — FENTANYL CITRATE 50 MCG: 50 INJECTION, SOLUTION INTRAMUSCULAR; INTRAVENOUS at 12:10

## 2023-10-02 RX ADMIN — ONDANSETRON 4 MG: 2 INJECTION INTRAMUSCULAR; INTRAVENOUS at 11:16

## 2023-10-02 RX ADMIN — PHENYLEPHRINE 10 MG/250 ML(40 MCG/ML)IN 0.9 % SOD.CHLORIDE INTRAVENOUS 0.02 MCG/KG/MIN: at 08:58

## 2023-10-02 RX ADMIN — MIDAZOLAM 2 MG: 1 INJECTION INTRAMUSCULAR; INTRAVENOUS at 07:49

## 2023-10-02 RX ADMIN — PROPOFOL 50 MCG/KG/MIN: 10 INJECTION, EMULSION INTRAVENOUS at 08:25

## 2023-10-02 RX ADMIN — PROPOFOL 100 MG: 10 INJECTION, EMULSION INTRAVENOUS at 07:49

## 2023-10-02 RX ADMIN — LIDOCAINE HYDROCHLORIDE 50 MG: 20 INJECTION INTRAVENOUS at 07:49

## 2023-10-02 RX ADMIN — SODIUM CHLORIDE: 9 INJECTION, SOLUTION INTRAVENOUS at 09:00

## 2023-10-02 RX ADMIN — SODIUM CHLORIDE 100 ML/HR: 9 INJECTION, SOLUTION INTRAVENOUS at 17:26

## 2023-10-02 RX ADMIN — OXYCODONE HYDROCHLORIDE 5 MG: 5 TABLET ORAL at 17:07

## 2023-10-02 RX ADMIN — FENTANYL CITRATE 100 MCG: 50 INJECTION, SOLUTION INTRAMUSCULAR; INTRAVENOUS at 07:49

## 2023-10-02 RX ADMIN — ROCURONIUM BROMIDE 20 MG: 10 INJECTION, SOLUTION INTRAVENOUS at 08:42

## 2023-10-02 RX ADMIN — SODIUM CHLORIDE, POTASSIUM CHLORIDE, SODIUM LACTATE AND CALCIUM CHLORIDE: 600; 310; 30; 20 INJECTION, SOLUTION INTRAVENOUS at 09:46

## 2023-10-02 RX ADMIN — SUGAMMADEX 200 MG: 100 INJECTION, SOLUTION INTRAVENOUS at 11:17

## 2023-10-02 RX ADMIN — PREGABALIN 50 MG: 50 CAPSULE ORAL at 21:01

## 2023-10-02 RX ADMIN — CYCLOBENZAPRINE 10 MG: 10 TABLET, FILM COATED ORAL at 17:24

## 2023-10-02 RX ADMIN — HYDROMORPHONE HYDROCHLORIDE 0.5 MG: 2 INJECTION, SOLUTION INTRAMUSCULAR; INTRAVENOUS; SUBCUTANEOUS at 12:11

## 2023-10-02 RX ADMIN — HYDROMORPHONE HYDROCHLORIDE 1 MG: 1 INJECTION, SOLUTION INTRAMUSCULAR; INTRAVENOUS; SUBCUTANEOUS at 12:36

## 2023-10-02 RX ADMIN — Medication: at 12:00

## 2023-10-02 RX ADMIN — OXYCODONE HYDROCHLORIDE 5 MG: 5 TABLET ORAL at 22:40

## 2023-10-02 RX ADMIN — Medication 100 MCG: at 08:57

## 2023-10-02 RX ADMIN — SODIUM CHLORIDE 100 ML/HR: 9 INJECTION, SOLUTION INTRAVENOUS at 21:02

## 2023-10-02 RX ADMIN — PHENYLEPHRINE 10 MG/250 ML(40 MCG/ML)IN 0.9 % SOD.CHLORIDE INTRAVENOUS 0.02 MCG/KG/MIN: at 10:08

## 2023-10-02 RX ADMIN — PREGABALIN 50 MG: 50 CAPSULE ORAL at 17:24

## 2023-10-02 RX ADMIN — PROPOFOL 50 MG: 10 INJECTION, EMULSION INTRAVENOUS at 08:16

## 2023-10-02 RX ADMIN — MORPHINE SULFATE 2 MG: 2 INJECTION, SOLUTION INTRAMUSCULAR; INTRAVENOUS at 20:44

## 2023-10-02 RX ADMIN — HYDRALAZINE HYDROCHLORIDE 10 MG: 10 TABLET, FILM COATED ORAL at 21:01

## 2023-10-02 RX ADMIN — DOCUSATE SODIUM 100 MG: 100 CAPSULE, LIQUID FILLED ORAL at 21:01

## 2023-10-02 RX ADMIN — SODIUM CHLORIDE, SODIUM LACTATE, POTASSIUM CHLORIDE, AND CALCIUM CHLORIDE 100 ML/HR: 600; 310; 30; 20 INJECTION, SOLUTION INTRAVENOUS at 12:00

## 2023-10-02 RX ADMIN — DULOXETINE HYDROCHLORIDE 60 MG: 30 CAPSULE, DELAYED RELEASE ORAL at 17:24

## 2023-10-02 RX ADMIN — Medication 100 MCG: at 08:58

## 2023-10-02 RX ADMIN — SODIUM CHLORIDE, POTASSIUM CHLORIDE, SODIUM LACTATE AND CALCIUM CHLORIDE 1000 ML: 600; 310; 30; 20 INJECTION, SOLUTION INTRAVENOUS at 11:53

## 2023-10-02 RX ADMIN — SODIUM CHLORIDE, POTASSIUM CHLORIDE, SODIUM LACTATE AND CALCIUM CHLORIDE: 600; 310; 30; 20 INJECTION, SOLUTION INTRAVENOUS at 07:39

## 2023-10-02 RX ADMIN — ROCURONIUM BROMIDE 50 MG: 10 INJECTION, SOLUTION INTRAVENOUS at 07:49

## 2023-10-02 RX ADMIN — ALLOPURINOL 300 MG: 300 TABLET ORAL at 17:23

## 2023-10-02 RX ADMIN — HYDROMORPHONE HYDROCHLORIDE 0.4 MG: 2 INJECTION, SOLUTION INTRAMUSCULAR; INTRAVENOUS; SUBCUTANEOUS at 11:22

## 2023-10-02 RX ADMIN — HYDROMORPHONE HYDROCHLORIDE 0.6 MG: 2 INJECTION, SOLUTION INTRAMUSCULAR; INTRAVENOUS; SUBCUTANEOUS at 11:38

## 2023-10-02 RX ADMIN — CYCLOBENZAPRINE 10 MG: 10 TABLET, FILM COATED ORAL at 22:41

## 2023-10-02 SDOH — SOCIAL STABILITY: SOCIAL INSECURITY: ARE THERE ANY APPARENT SIGNS OF INJURIES/BEHAVIORS THAT COULD BE RELATED TO ABUSE/NEGLECT?: NO

## 2023-10-02 SDOH — SOCIAL STABILITY: SOCIAL INSECURITY: DO YOU FEEL UNSAFE GOING BACK TO THE PLACE WHERE YOU ARE LIVING?: NO

## 2023-10-02 SDOH — SOCIAL STABILITY: SOCIAL INSECURITY: ARE YOU OR HAVE YOU BEEN THREATENED OR ABUSED PHYSICALLY, EMOTIONALLY, OR SEXUALLY BY ANYONE?: NO

## 2023-10-02 SDOH — SOCIAL STABILITY: SOCIAL INSECURITY: HAS ANYONE EVER THREATENED TO HURT YOUR FAMILY OR YOUR PETS?: NO

## 2023-10-02 SDOH — SOCIAL STABILITY: SOCIAL INSECURITY: ABUSE: ADULT

## 2023-10-02 SDOH — SOCIAL STABILITY: SOCIAL INSECURITY: WERE YOU ABLE TO COMPLETE ALL THE BEHAVIORAL HEALTH SCREENINGS?: YES

## 2023-10-02 SDOH — SOCIAL STABILITY: SOCIAL INSECURITY: DO YOU FEEL ANYONE HAS EXPLOITED OR TAKEN ADVANTAGE OF YOU FINANCIALLY OR OF YOUR PERSONAL PROPERTY?: NO

## 2023-10-02 SDOH — SOCIAL STABILITY: SOCIAL INSECURITY: HAVE YOU HAD THOUGHTS OF HARMING ANYONE ELSE?: NO

## 2023-10-02 SDOH — SOCIAL STABILITY: SOCIAL INSECURITY: DOES ANYONE TRY TO KEEP YOU FROM HAVING/CONTACTING OTHER FRIENDS OR DOING THINGS OUTSIDE YOUR HOME?: NO

## 2023-10-02 ASSESSMENT — COGNITIVE AND FUNCTIONAL STATUS - GENERAL
TURNING FROM BACK TO SIDE WHILE IN FLAT BAD: A LITTLE
TOILETING: A LITTLE
PERSONAL GROOMING: A LITTLE
HELP NEEDED FOR BATHING: A LITTLE
WALKING IN HOSPITAL ROOM: A LITTLE
DAILY ACTIVITIY SCORE: 19
DRESSING REGULAR UPPER BODY CLOTHING: A LITTLE
DRESSING REGULAR LOWER BODY CLOTHING: A LITTLE
STANDING UP FROM CHAIR USING ARMS: A LITTLE
MOBILITY SCORE: 17
PATIENT BASELINE BEDBOUND: NO
MOVING FROM LYING ON BACK TO SITTING ON SIDE OF FLAT BED WITH BEDRAILS: A LITTLE
CLIMB 3 TO 5 STEPS WITH RAILING: A LOT
MOVING TO AND FROM BED TO CHAIR: A LITTLE

## 2023-10-02 ASSESSMENT — LIFESTYLE VARIABLES
SKIP TO QUESTIONS 9-10: 1
SUBSTANCE_ABUSE_PAST_12_MONTHS: NO
PRESCIPTION_ABUSE_PAST_12_MONTHS: NO
AUDIT-C TOTAL SCORE: 0
AUDIT-C TOTAL SCORE: 0
HOW OFTEN DO YOU HAVE A DRINK CONTAINING ALCOHOL: NEVER
HOW MANY STANDARD DRINKS CONTAINING ALCOHOL DO YOU HAVE ON A TYPICAL DAY: PATIENT DOES NOT DRINK
HOW OFTEN DO YOU HAVE 6 OR MORE DRINKS ON ONE OCCASION: NEVER

## 2023-10-02 ASSESSMENT — PAIN - FUNCTIONAL ASSESSMENT
PAIN_FUNCTIONAL_ASSESSMENT: 0-10

## 2023-10-02 ASSESSMENT — PATIENT HEALTH QUESTIONNAIRE - PHQ9
1. LITTLE INTEREST OR PLEASURE IN DOING THINGS: NOT AT ALL
2. FEELING DOWN, DEPRESSED OR HOPELESS: NOT AT ALL
SUM OF ALL RESPONSES TO PHQ9 QUESTIONS 1 & 2: 0

## 2023-10-02 ASSESSMENT — ACTIVITIES OF DAILY LIVING (ADL)
ASSISTIVE_DEVICE: WALKER
HEARING - LEFT EAR: FUNCTIONAL
TOILETING: NEEDS ASSISTANCE
GROOMING: NEEDS ASSISTANCE
PATIENT'S MEMORY ADEQUATE TO SAFELY COMPLETE DAILY ACTIVITIES?: NO
ADEQUATE_TO_COMPLETE_ADL: YES
DRESSING YOURSELF: NEEDS ASSISTANCE
FEEDING YOURSELF: NEEDS ASSISTANCE
BATHING: NEEDS ASSISTANCE
JUDGMENT_ADEQUATE_SAFELY_COMPLETE_DAILY_ACTIVITIES: YES
WALKS IN HOME: NEEDS ASSISTANCE
HEARING - RIGHT EAR: FUNCTIONAL

## 2023-10-02 ASSESSMENT — PAIN SCALES - GENERAL
PAINLEVEL_OUTOF10: 6
PAINLEVEL_OUTOF10: 9
PAINLEVEL_OUTOF10: 9
PAINLEVEL_OUTOF10: 10 - WORST POSSIBLE PAIN
PAINLEVEL_OUTOF10: 9
PAIN_LEVEL: 7
PAINLEVEL_OUTOF10: 0 - NO PAIN
PAINLEVEL_OUTOF10: 5 - MODERATE PAIN

## 2023-10-02 ASSESSMENT — COLUMBIA-SUICIDE SEVERITY RATING SCALE - C-SSRS
2. HAVE YOU ACTUALLY HAD ANY THOUGHTS OF KILLING YOURSELF?: NO
1. IN THE PAST MONTH, HAVE YOU WISHED YOU WERE DEAD OR WISHED YOU COULD GO TO SLEEP AND NOT WAKE UP?: NO
6. HAVE YOU EVER DONE ANYTHING, STARTED TO DO ANYTHING, OR PREPARED TO DO ANYTHING TO END YOUR LIFE?: NO

## 2023-10-02 ASSESSMENT — PAIN DESCRIPTION - DESCRIPTORS: DESCRIPTORS: ACHING

## 2023-10-02 NOTE — ANESTHESIA POSTPROCEDURE EVALUATION
Patient: Danny Bah    Procedure Summary       Date: 10/02/23 Room / Location: [facility] OR 08 / Virtual [facility] OR    Anesthesia Start: 0739 Anesthesia Stop:     Procedure: L3-4, L4-5, L5-S1 DECOMPRESSIVE LAMINECTOMY WITH BILATERAL L5-S1 FORAMINOTOMIES & L4-5 DISC EXCISION WITH FLAT PLATE X-RAY (Bilateral) Diagnosis:       Intervertebral disc disorders with radiculopathy, lumbar region      (Intervertebral disc disorders with radiculopathy, lumbar region [M51.16])    Surgeons: Geronimo Vasquez MD Responsible Provider: Alverto Dawson MD    Anesthesia Type: general ASA Status: 2            Anesthesia Type: general    Vitals Value Taken Time   /72 10/02/23 1147   Temp 36.2 °C (97.2 °F) 10/02/23 1147   Pulse 75 10/02/23 1205   Resp 16 10/02/23 1147   SpO2 100 % 10/02/23 1205   Vitals shown include unvalidated device data.    Anesthesia Post Evaluation    Patient location during evaluation: bedside  Patient participation: complete - patient participated  Level of consciousness: awake and awake and alert  Pain score: 7  Pain management: inadequate  Airway patency: patent  Cardiovascular status: acceptable  Respiratory status: acceptable  Hydration status: acceptable        There were no known notable events for this encounter.

## 2023-10-02 NOTE — PERIOPERATIVE NURSING NOTE
Pt awake, alert. He tolerated ginger ale well. shannon called to Daja MARY.  5266- Transferred to 202 with transporters as ordered. [place] remain with pt

## 2023-10-02 NOTE — ANESTHESIA PREPROCEDURE EVALUATION
Patient: Danny Bah    Procedure Information       Date/Time: 10/02/23 2933    Procedure: L3-4, L4-5, L5-S1 DECOMPRESSIVE LAMINECTOMY WITH BILATERAL L5-S1 FORAMINOTOMIES & L4-5 DISC EXCISION WITH FLAT PLATE X-RAY (Bilateral)    Location: [facility] OR 08 / Virtual [facility] OR    Surgeons: Geronimo Vasquez MD            Relevant Problems   No relevant active problems       Clinical information reviewed:    Allergies                NPO Detail:  No data recorded     Physical Exam    Airway  Mallampati: I  TM distance: >3 FB  Neck ROM: full     Cardiovascular   Rhythm: regular     Dental   (+) upper dentures, lower dentures     Pulmonary    Abdominal            Anesthesia Plan    ASA 2     general     intravenous induction   Postoperative administration of opioids is intended.  Anesthetic plan and risks discussed with patient.    Plan discussed with CRNA.

## 2023-10-02 NOTE — ANESTHESIA PROCEDURE NOTES
Airway  Date/Time: 10/2/2023 7:51 AM  Urgency: elective    Airway not difficult    Staffing  Performed: ABNDAR   Authorized by: BANDAR Velazquez    Performed by: BANDAR Velazquez  Patient location during procedure: OR    Indications and Patient Condition  Indications for airway management: anesthesia  Sedation level: deep  Preoxygenated: yes  Patient position: sniffing  MILS maintained throughout  Mask difficulty assessment: 1 - vent by mask    Final Airway Details  Final airway type: endotracheal airway      Successful airway: ETT  Cuffed: yes   Successful intubation technique: video laryngoscopy  Facilitating devices/methods: intubating stylet  Blade size: #4  ETT size (mm): 8.0  Cormack-Lehane Classification: grade I - full view of glottis  Placement verified by: capnometry   Cuff volume (mL): 8  Measured from: lips  ETT to lips (cm): 22  Number of attempts at approach: 1    Additional Comments  Elective GlideScope secondary to pt hx of cervical surgery. Head and neck maintained in neutral position throughout BMV and inbuation. Easy BMV, easy intubation.

## 2023-10-02 NOTE — CARE PLAN
Problem: Pain  Goal: Takes deep breaths with improved pain control throughout the shift  10/2/2023 1602 by Daja Gama RN  Outcome: Progressing  10/2/2023 1601 by Daja Gama RN  Outcome: Progressing  10/2/2023 1559 by Daja Gama RN  Outcome: Progressing  Goal: Turns in bed with improved pain control throughout the shift  10/2/2023 1602 by Daja Gama RN  Outcome: Progressing  10/2/2023 1601 by Daja Gama RN  Outcome: Progressing  10/2/2023 1559 by Daja Gama RN  Outcome: Progressing  Goal: Walks with improved pain control throughout the shift  10/2/2023 1602 by Daja Gama RN  Outcome: Progressing  10/2/2023 1601 by Daja Gama RN  Outcome: Progressing  10/2/2023 1559 by Daja Gama RN  Outcome: Progressing  Goal: Performs ADL's with improved pain control throughout shift  10/2/2023 1602 by Daja Gama RN  Outcome: Progressing  10/2/2023 1601 by Daja Gama RN  Outcome: Progressing  10/2/2023 1559 by Daja Gama RN  Outcome: Progressing  Goal: Participates in PT with improved pain control throughout the shift  10/2/2023 1602 by Daja Gama RN  Outcome: Progressing  10/2/2023 1601 by Daja Gama RN  Outcome: Progressing  10/2/2023 1559 by Daja Gama RN  Outcome: Progressing  Goal: Free from opioid side effects throughout the shift  10/2/2023 1602 by Daja Gama RN  Outcome: Progressing  10/2/2023 1601 by Daja Gama RN  Outcome: Progressing  10/2/2023 1559 by Daja Gama RN  Outcome: Progressing  Goal: Free from acute confusion related to pain meds throughout the shift  10/2/2023 1602 by Daja Gama RN  Outcome: Progressing  10/2/2023 1601 by Daja Gama RN  Outcome: Progressing  10/2/2023 1559 by Daja Gama RN  Outcome: Progressing   The patient's goals for the shift include

## 2023-10-02 NOTE — OP NOTE
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  The disc from L4-5 had migrated up behind the body of L4 and had encased itself and the natural scar.  I was able to remove this in its entirety.    Estimated Blood Loss: 200 cc    Drains: Lumbar epidural drain and Lee catheter    Specimen:No specimens collected     Implants: Nothing was implanted during the procedure     Procedure Detail:  The patient was brought into the operating room and a timeout huddle was performed.  General anesthesia was administered.  Baseline evoked potentials were obtained because of the history of severe cervical canal stenosis.  Once the baselines were performed we position the patient prone on the Anderson frame.  The back was clipped and draped in usual sterile fashion.  An x-ray was taken to verify proper location for the skin incision.  Midline incision was drawn out and then it was infiltrated with local anesthetic.  The skin was incised with a #10 blade.  Hemostasis was obtained with the bipolar electrocautery.  And the Bovie was used to incise down through the subcutaneous fat down to the fascia.  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  It was freed up and removed without a durotomy.    Estimated Blood Loss: 50 cc    Drains: Lumbar epidural drain and a Lee catheter    Specimen:No specimens collected     Implants: Nothing was implanted during the procedure     Procedure Detail:    The patient was brought into the operating room and a timeout huddle was performed.  General anesthesia was administered.  Baseline evoked potentials were obtained because of the history of severe cervical canal stenosis.  Once the baselines were performed we position the patient prone on the Anderson frame.  The back was clipped and draped in usual sterile fashion.  An x-ray was taken to verify proper location for the skin incision.  Midline incision was drawn out and then it was infiltrated with local anesthetic.  The skin was incised with a #10 blade.  Hemostasis was obtained with the bipolar electrocautery.  And the Bovie was used to incise down through the subcutaneous fat down to the fascia.  The fascia was incised along the spinous processes of L3, L4, and L5.  Then using the Bovie and  Amato elevators a subperiosteal dissection was made exposing the spinous processes out to the lamina and facets at L3-L4 and L5.  An x-ray was taken to verify proper location.  The Allis clamps were at the top of L4 and L5.  Once confirmation was made I remove the spinous process with the Leksell at L3, L4, and L5.  I then thinned the lamina with a Leksell.  I remove the remaining lamina with the Kerrison punches ranging from the 2 mm punch to the 4 mm punch.  I also used the curved foraminotomies openers but only used a 2 mm punch for this.  Once the decompression was completed to the top of the ligamentum flavum at L3-4 and down to the bottom of L5 then explored the disc space at L4-5.  This was womanly encountered a dense adherence to the undersurface of the thecal sac from the herniated disc.  I was able to dissect this off and bring out the disc fragments in multiple smaller pieces.  I mobilized the L4 nerve roots and the L5 nerve roots.  I followed them out the foramina.  Once I was satisfied that the thecal sac and nerve roots were free and irrigated with copious amounts of antibiotic solution.  I then used a bottle of Aricept to help with a postop wound infection prevention.  Then I took a drain and exited through separate stab site.  The muscle was allowed to fall back into its normal position after excellent hemostasis was obtained.  We had used copious amounts of irrigation before the Aricept as well.  And the fascia was closed in a watertight manner with interrupted 0 Vicryl sutures.  Then I used a strata fix 0 PDS suture to reinforce the fascial layer.  Putting 10 cc of half percent Marcaine into the paraspinal muscles.  I then used inverted interrupted 2-0 Vicryl in the subcutaneous tissue.  And then closed the skin with a subcuticular running stitch of 4-0 Vicryl and reinforced with glue.  The patient was stable and tolerated the procedure well.          Condition: stable     Complication: None      Geronimo Vasquez MD

## 2023-10-02 NOTE — CARE PLAN
Problem: Pain  Goal: Takes deep breaths with improved pain control throughout the shift  10/2/2023 1601 by Daja Gama RN  Outcome: Progressing  10/2/2023 1559 by Daja Gama RN  Outcome: Progressing  Goal: Turns in bed with improved pain control throughout the shift  10/2/2023 1601 by Daja Gama RN  Outcome: Progressing  10/2/2023 1559 by Daja Gama RN  Outcome: Progressing  Goal: Walks with improved pain control throughout the shift  10/2/2023 1601 by Daja Gama RN  Outcome: Progressing  10/2/2023 1559 by Daja Gama RN  Outcome: Progressing  Goal: Performs ADL's with improved pain control throughout shift  10/2/2023 1601 by Daja Gama RN  Outcome: Progressing  10/2/2023 1559 by Daja Gama RN  Outcome: Progressing  Goal: Participates in PT with improved pain control throughout the shift  10/2/2023 1601 by Daja Gama RN  Outcome: Progressing  10/2/2023 1559 by Daja Gama RN  Outcome: Progressing  Goal: Free from opioid side effects throughout the shift  10/2/2023 1601 by Daja Gama RN  Outcome: Progressing  10/2/2023 1559 by Daja Gama RN  Outcome: Progressing  Goal: Free from acute confusion related to pain meds throughout the shift  10/2/2023 1601 by Daja Gama RN  Outcome: Progressing  10/2/2023 1559 by Daja Gama RN  Outcome: Progressing   The patient's goals for the shift include

## 2023-10-02 NOTE — ANESTHESIA PROCEDURE NOTES
Arterial Line:    Date/Time: 10/2/2023 8:13 AM    Staffing  Performed: attending   Authorized by: BANDAR Velazquez    Performed by: BANDAR Velazquez    An arterial line was placed. in the OR for the following indication(s): continuous blood pressure monitoring.    A 20 gauge (size) (length), Angiocath (type) catheter was placed into the Right radial artery, secured by Tegaderm,   Seldinger technique used.  Events:  greater than 3 attempts, patient tolerated procedure well with no complications and Attempt x 2 by CAA using sterile technique throughout. Unable to thread wire.  Attempt x 2 by attending using sterile techniqu throughout. All attempts R Radial.  Successful attempt on 4th attempt, good wavefrorm, no hematoma, secured with tegaderm and tape.

## 2023-10-02 NOTE — CONSULTS
Reason For Consult  Patient seen following lumbar surgery , for management of medical problems     History Of Present Illness  Danny Bah is a 82 y.o. male presenting following laminectomy .     Past Medical History  Hypothyroidism, chronic lower back pain, adrenal insufficiency     Surgical History  He has no past surgical history on file.     Social History  He reports that he has never smoked. He has never used smokeless tobacco. He reports that he does not currently use alcohol. He reports that he does not use drugs.    Family History  Non contributory; lives with his wife and daughter      Allergies  Patient has no known allergies.    Review of Systems  Denies SOB , chest pain, nausea;    /70   Pulse 76   Temp 36.2 °C (97.2 °F) (Temporal)   Resp 18   Ht 1.829 m (6')   Wt 97.6 kg (215 lb 2.7 oz)   SpO2 98%   BMI 29.18 kg/m²    Physical Exam  Alert , in moderate pain,   No JVD  Lungs clear  Abdomen soft BS+  No calf edema   Last Recorded Vitals  Blood pressure 116/70, pulse 76, temperature 36.2 °C (97.2 °F), temperature source Temporal, resp. rate 18, height 1.829 m (6'), weight 97.6 kg (215 lb 2.7 oz), SpO2 98 %.    Relevant Results     Assessment/Plan   Patient Active Problem List   Diagnosis    Pseudoclaudication syndrome    Moderate recurrent major depression (CMS/HCC)    Other specified hypothyroidism    Truchas's disease (CMS/HCC)    Generalized anxiety disorder    Chronic pain        All medications reviewed     I spent 30 minutes in the professional and overall care of this patient.      Seda Whitaker MD

## 2023-10-03 ENCOUNTER — APPOINTMENT (OUTPATIENT)
Dept: RADIOLOGY | Facility: HOSPITAL | Age: 83
End: 2023-10-03
Payer: MEDICARE

## 2023-10-03 ENCOUNTER — PREP FOR PROCEDURE (OUTPATIENT)
Dept: SURGERY | Facility: HOSPITAL | Age: 83
End: 2023-10-03

## 2023-10-03 PROBLEM — J43.8 OTHER EMPHYSEMA (MULTI): Status: ACTIVE | Noted: 2023-10-03

## 2023-10-03 PROBLEM — M1A.09X0 CHRONIC GOUT OF MULTIPLE SITES: Status: ACTIVE | Noted: 2023-08-14

## 2023-10-03 PROBLEM — J84.9 INTERSTITIAL LUNG DISEASE (MULTI): Status: RESOLVED | Noted: 2023-08-14 | Resolved: 2023-10-03

## 2023-10-03 PROBLEM — M48.061 SPINAL STENOSIS OF LUMBAR REGION AT MULTIPLE LEVELS: Status: ACTIVE | Noted: 2023-08-14

## 2023-10-03 LAB
ANION GAP SERPL CALC-SCNC: 8 MMOL/L (ref 10–20)
BUN SERPL-MCNC: 16 MG/DL (ref 6–23)
CALCIUM SERPL-MCNC: 8.7 MG/DL (ref 8.6–10.3)
CHLORIDE SERPL-SCNC: 101 MMOL/L (ref 98–107)
CO2 SERPL-SCNC: 32 MMOL/L (ref 21–32)
CREAT SERPL-MCNC: 0.87 MG/DL (ref 0.5–1.3)
ERYTHROCYTE [DISTWIDTH] IN BLOOD BY AUTOMATED COUNT: 16.8 % (ref 11.5–14.5)
GFR SERPL CREATININE-BSD FRML MDRD: 87 ML/MIN/1.73M*2
GLUCOSE SERPL-MCNC: 102 MG/DL (ref 74–99)
HCT VFR BLD AUTO: 36.1 % (ref 41–52)
HGB BLD-MCNC: 11.2 G/DL (ref 13.5–17.5)
MCH RBC QN AUTO: 28.1 PG (ref 26–34)
MCHC RBC AUTO-ENTMCNC: 31 G/DL (ref 32–36)
MCV RBC AUTO: 91 FL (ref 80–100)
NRBC BLD-RTO: 0 /100 WBCS (ref 0–0)
PLATELET # BLD AUTO: 199 X10*3/UL (ref 150–450)
PMV BLD AUTO: 10.8 FL (ref 7.5–11.5)
POTASSIUM SERPL-SCNC: 4.4 MMOL/L (ref 3.5–5.3)
RBC # BLD AUTO: 3.98 X10*6/UL (ref 4.5–5.9)
SODIUM SERPL-SCNC: 137 MMOL/L (ref 136–145)
WBC # BLD AUTO: 9.4 X10*3/UL (ref 4.4–11.3)

## 2023-10-03 PROCEDURE — 7100000011 HC EXTENDED STAY RECOVERY HOURLY - NURSING UNIT

## 2023-10-03 PROCEDURE — 2500000001 HC RX 250 WO HCPCS SELF ADMINISTERED DRUGS (ALT 637 FOR MEDICARE OP): Performed by: CLINICAL NURSE SPECIALIST

## 2023-10-03 PROCEDURE — 36415 COLL VENOUS BLD VENIPUNCTURE: CPT | Performed by: CLINICAL NURSE SPECIALIST

## 2023-10-03 PROCEDURE — 97161 PT EVAL LOW COMPLEX 20 MIN: CPT | Mod: GP

## 2023-10-03 PROCEDURE — 80048 BASIC METABOLIC PNL TOTAL CA: CPT | Performed by: CLINICAL NURSE SPECIALIST

## 2023-10-03 PROCEDURE — 2500000001 HC RX 250 WO HCPCS SELF ADMINISTERED DRUGS (ALT 637 FOR MEDICARE OP): Performed by: INTERNAL MEDICINE

## 2023-10-03 PROCEDURE — 2500000004 HC RX 250 GENERAL PHARMACY W/ HCPCS (ALT 636 FOR OP/ED): Performed by: CLINICAL NURSE SPECIALIST

## 2023-10-03 PROCEDURE — 2500000001 HC RX 250 WO HCPCS SELF ADMINISTERED DRUGS (ALT 637 FOR MEDICARE OP): Performed by: NEUROLOGICAL SURGERY

## 2023-10-03 PROCEDURE — 72131 CT LUMBAR SPINE W/O DYE: CPT | Performed by: RADIOLOGY

## 2023-10-03 PROCEDURE — 85027 COMPLETE CBC AUTOMATED: CPT | Performed by: CLINICAL NURSE SPECIALIST

## 2023-10-03 PROCEDURE — 94640 AIRWAY INHALATION TREATMENT: CPT

## 2023-10-03 PROCEDURE — 99024 POSTOP FOLLOW-UP VISIT: CPT | Performed by: NURSE PRACTITIONER

## 2023-10-03 PROCEDURE — 97165 OT EVAL LOW COMPLEX 30 MIN: CPT | Mod: GO

## 2023-10-03 PROCEDURE — 2500000004 HC RX 250 GENERAL PHARMACY W/ HCPCS (ALT 636 FOR OP/ED): Performed by: NEUROLOGICAL SURGERY

## 2023-10-03 PROCEDURE — 2500000001 HC RX 250 WO HCPCS SELF ADMINISTERED DRUGS (ALT 637 FOR MEDICARE OP): Performed by: NURSE PRACTITIONER

## 2023-10-03 PROCEDURE — 2500000004 HC RX 250 GENERAL PHARMACY W/ HCPCS (ALT 636 FOR OP/ED): Performed by: INTERNAL MEDICINE

## 2023-10-03 PROCEDURE — 2500000002 HC RX 250 W HCPCS SELF ADMINISTERED DRUGS (ALT 637 FOR MEDICARE OP, ALT 636 FOR OP/ED): Performed by: ANESTHESIOLOGY

## 2023-10-03 PROCEDURE — G1004 CDSM NDSC: HCPCS

## 2023-10-03 RX ORDER — TRAMADOL HYDROCHLORIDE 50 MG/1
50 TABLET ORAL ONCE
Status: COMPLETED | OUTPATIENT
Start: 2023-10-03 | End: 2023-10-03

## 2023-10-03 RX ORDER — FUROSEMIDE 20 MG/1
20 TABLET ORAL DAILY
Status: DISCONTINUED | OUTPATIENT
Start: 2023-10-03 | End: 2023-10-07 | Stop reason: HOSPADM

## 2023-10-03 RX ORDER — OXYCODONE HYDROCHLORIDE 5 MG/1
10 TABLET ORAL EVERY 6 HOURS PRN
Status: DISCONTINUED | OUTPATIENT
Start: 2023-10-03 | End: 2023-10-07 | Stop reason: HOSPADM

## 2023-10-03 RX ORDER — TAMSULOSIN HYDROCHLORIDE 0.4 MG/1
1 CAPSULE ORAL 2 TIMES DAILY
COMMUNITY
Start: 2023-07-20

## 2023-10-03 RX ORDER — MORPHINE SULFATE 30 MG/1
30 TABLET, FILM COATED, EXTENDED RELEASE ORAL EVERY 12 HOURS SCHEDULED
Status: DISCONTINUED | OUTPATIENT
Start: 2023-10-03 | End: 2023-10-07 | Stop reason: HOSPADM

## 2023-10-03 RX ORDER — TAMSULOSIN HYDROCHLORIDE 0.4 MG/1
0.4 CAPSULE ORAL 2 TIMES DAILY
Status: DISCONTINUED | OUTPATIENT
Start: 2023-10-03 | End: 2023-10-07 | Stop reason: HOSPADM

## 2023-10-03 RX ORDER — TESTOSTERONE CYPIONATE 200 MG/ML
INJECTION, SOLUTION INTRAMUSCULAR
COMMUNITY
Start: 2023-04-30 | End: 2023-10-19 | Stop reason: ALTCHOICE

## 2023-10-03 RX ORDER — HYDROCORTISONE 10 MG/1
10 TABLET ORAL DAILY
Status: DISCONTINUED | OUTPATIENT
Start: 2023-10-03 | End: 2023-10-07 | Stop reason: HOSPADM

## 2023-10-03 RX ADMIN — FUROSEMIDE 20 MG: 20 TABLET ORAL at 09:19

## 2023-10-03 RX ADMIN — CEFAZOLIN SODIUM 2 G: 2 INJECTION, SOLUTION INTRAVENOUS at 00:03

## 2023-10-03 RX ADMIN — PREGABALIN 50 MG: 50 CAPSULE ORAL at 09:19

## 2023-10-03 RX ADMIN — OXYCODONE HYDROCHLORIDE 10 MG: 5 TABLET ORAL at 09:19

## 2023-10-03 RX ADMIN — CYCLOBENZAPRINE 10 MG: 10 TABLET, FILM COATED ORAL at 14:04

## 2023-10-03 RX ADMIN — MORPHINE SULFATE 30 MG: 30 TABLET, EXTENDED RELEASE ORAL at 20:40

## 2023-10-03 RX ADMIN — HYDROCORTISONE 10 MG: 10 TABLET ORAL at 11:11

## 2023-10-03 RX ADMIN — Medication 5 G: at 09:00

## 2023-10-03 RX ADMIN — MORPHINE SULFATE 2 MG: 2 INJECTION, SOLUTION INTRAMUSCULAR; INTRAVENOUS at 05:57

## 2023-10-03 RX ADMIN — ALLOPURINOL 300 MG: 300 TABLET ORAL at 09:19

## 2023-10-03 RX ADMIN — MORPHINE SULFATE 2 MG: 2 INJECTION, SOLUTION INTRAMUSCULAR; INTRAVENOUS at 00:19

## 2023-10-03 RX ADMIN — LEVOTHYROXINE SODIUM 150 MCG: 150 TABLET ORAL at 06:43

## 2023-10-03 RX ADMIN — HYDRALAZINE HYDROCHLORIDE 10 MG: 10 TABLET, FILM COATED ORAL at 22:10

## 2023-10-03 RX ADMIN — TAMSULOSIN HYDROCHLORIDE 0.4 MG: 0.4 CAPSULE ORAL at 23:45

## 2023-10-03 RX ADMIN — OXYCODONE HYDROCHLORIDE 10 MG: 5 TABLET ORAL at 16:08

## 2023-10-03 RX ADMIN — DOCUSATE SODIUM 100 MG: 100 CAPSULE, LIQUID FILLED ORAL at 22:08

## 2023-10-03 RX ADMIN — PREGABALIN 50 MG: 50 CAPSULE ORAL at 23:46

## 2023-10-03 RX ADMIN — DULOXETINE HYDROCHLORIDE 60 MG: 30 CAPSULE, DELAYED RELEASE ORAL at 09:19

## 2023-10-03 RX ADMIN — CYCLOBENZAPRINE 10 MG: 10 TABLET, FILM COATED ORAL at 05:57

## 2023-10-03 RX ADMIN — POTASSIUM CHLORIDE 20 MEQ: 1500 TABLET, EXTENDED RELEASE ORAL at 09:19

## 2023-10-03 RX ADMIN — HYDRALAZINE HYDROCHLORIDE 10 MG: 10 TABLET, FILM COATED ORAL at 09:20

## 2023-10-03 RX ADMIN — TAMSULOSIN HYDROCHLORIDE 0.4 MG: 0.4 CAPSULE ORAL at 09:22

## 2023-10-03 RX ADMIN — PREGABALIN 50 MG: 50 CAPSULE ORAL at 14:04

## 2023-10-03 RX ADMIN — MORPHINE SULFATE 30 MG: 30 TABLET, EXTENDED RELEASE ORAL at 12:05

## 2023-10-03 RX ADMIN — HYDRALAZINE HYDROCHLORIDE 10 MG: 10 TABLET, FILM COATED ORAL at 14:04

## 2023-10-03 RX ADMIN — TRAMADOL HYDROCHLORIDE 50 MG: 50 TABLET, COATED ORAL at 08:10

## 2023-10-03 RX ADMIN — DOCUSATE SODIUM 100 MG: 100 CAPSULE, LIQUID FILLED ORAL at 09:19

## 2023-10-03 RX ADMIN — CYCLOBENZAPRINE 10 MG: 10 TABLET, FILM COATED ORAL at 22:09

## 2023-10-03 RX ADMIN — OXYCODONE HYDROCHLORIDE 10 MG: 5 TABLET ORAL at 22:11

## 2023-10-03 RX ADMIN — ALBUTEROL SULFATE 2.5 MG: 2.5 SOLUTION RESPIRATORY (INHALATION) at 09:55

## 2023-10-03 RX ADMIN — PANTOPRAZOLE SODIUM 40 MG: 40 TABLET, DELAYED RELEASE ORAL at 06:43

## 2023-10-03 ASSESSMENT — COGNITIVE AND FUNCTIONAL STATUS - GENERAL
CLIMB 3 TO 5 STEPS WITH RAILING: A LOT
MOBILITY SCORE: 16
STANDING UP FROM CHAIR USING ARMS: A LITTLE
WALKING IN HOSPITAL ROOM: A LITTLE
HELP NEEDED FOR BATHING: A LOT
DRESSING REGULAR LOWER BODY CLOTHING: A LOT
TOILETING: A LITTLE
DAILY ACTIVITIY SCORE: 19
MOVING TO AND FROM BED TO CHAIR: A LITTLE
TURNING FROM BACK TO SIDE WHILE IN FLAT BAD: A LOT
MOVING FROM LYING ON BACK TO SITTING ON SIDE OF FLAT BED WITH BEDRAILS: A LITTLE

## 2023-10-03 ASSESSMENT — PAIN - FUNCTIONAL ASSESSMENT
PAIN_FUNCTIONAL_ASSESSMENT: 0-10

## 2023-10-03 ASSESSMENT — PAIN SCALES - GENERAL
PAINLEVEL_OUTOF10: 10 - WORST POSSIBLE PAIN
PAINLEVEL_OUTOF10: 8
PAINLEVEL_OUTOF10: 9
PAINLEVEL_OUTOF10: 8
PAINLEVEL_OUTOF10: 3
PAINLEVEL_OUTOF10: 10 - WORST POSSIBLE PAIN
PAINLEVEL_OUTOF10: 3
PAINLEVEL_OUTOF10: 10 - WORST POSSIBLE PAIN
PAINLEVEL_OUTOF10: 9

## 2023-10-03 ASSESSMENT — ACTIVITIES OF DAILY LIVING (ADL): BATHING_ASSISTANCE: MODERATE

## 2023-10-03 NOTE — PROGRESS NOTES
Occupational Therapy                 Therapy Communication Note    Patient Name: Danny Bah  MRN: [id number]  Today's Date: 10/3/2023     Discipline: Occupational Therapy    Missed Visit Reason: Missed Visit Reason:  (Per conference with RN, patient is going for stat CT.)    Missed Time: Attempted at 0806.

## 2023-10-03 NOTE — PROGRESS NOTES
This is a clarification note.    The patient just returned from CAT scan of his lumbar spine.  I do not see signs of an epidural hematoma.  I do see air pockets anterior to the thecal sac where the disc fragment was removed.  I do not see any signs of fractured facets or instability.      Geronimo Vasquez MD, FAANS, FACS  Board Certified Neurosurgeon  [facility]   of Neurological Surgery  [employer] [facility] [employer]  Office: [phone]  Fax: [phone]

## 2023-10-03 NOTE — CARE PLAN
Problem: ADLs  Goal: LE bathing  Description: SBA with adaptive equipment PRN  Outcome: Progressing  Goal: LE dressing  Description: SBA with adaptive equipment PRN    Outcome: Progressing  Goal: Toileting  Description: SBA with adaptive equipment PRN  Outcome: Progressing     Problem: TRANSFERS  Goal: To/from bed, chair, commode  Description: With SBA using DME for safety prn  Outcome: Progressing     Problem: MOBILITY  Goal: Functional mobility  Description: SBA with DME for safety and compliance to post op precautions and walker safety techs.  Outcome: Progressing

## 2023-10-03 NOTE — PROGRESS NOTES
Occupational Therapy    Evaluation    Patient Name: Danny Bah  MRN: [id number]  Today's Date: 10/3/2023  Time Calculation  Start Time: 1059  Stop Time: 1115  Time Calculation (min): 16 min        Assessment:  Prognosis: Good  Barriers to Discharge:  (Pain)  Evaluation/Treatment Tolerance: Patient limited by pain  Medical Staff Made Aware: Yes  OT Assessment Results: Decreased ADL status, Decreased safe judgment during ADL, Decreased endurance, Decreased functional mobility  Medical Staff Made Aware: Yes  Strengths: Ability to acquire knowledge, Living arrangement secure  Plan:  Treatment Interventions: ADL retraining, Functional transfer training, Endurance training, Equipment evaluation/education, Patient/family training, Neuromuscular reeducation  OT Frequency: 5 times per week  OT Discharge Recommendations: High intensity level of continued care  Treatment Interventions: ADL retraining, Functional transfer training, Endurance training, Equipment evaluation/education, Patient/family training, Neuromuscular reeducation    Subjective     General:  General  Reason for Referral: OT eval & treat s/p surgery (s/p L3-4, L4-5, L5-S1 decompressive laminectomy with B L5-S1 foraminotomies & L4-5 disc excision)  Referred By: Geronimo Vasquez  Past Medical History Relevant to Rehab: 10/2/23 back surgery; chronic pain, moderate recurrent major depression, MIKHAIL, hypothyroidism  Missed Visit: Yes  Missed Visit Reason:  (Per conference with RN, patient is going for stat CT.)  Prior to Session Communication: Bedside nurse (Per conference with RN, patient is medically stable for therapy eval.)  Patient Position Received:  (Patient up in chair prior to eval; requesting to go back to bed following eval.  Patient positioned for comfort in bed, call light in reach.)  Precautions:  Precautions Comment: back/spinal, 3 L O2, fall/safety, back brace  Vital Signs:     Pain:  Pain Assessment  Pain Assessment: 0-10  Pain Score: 9  Pain  Interventions:  (Nursing aware)    Objective   Cognition:  Overall Cognitive Status:  (A & O x 4, anxious, impulsive, mod cues for safety)           Home Living:  Type of Home:  (Lives with spouse & daughter; bed/bath 1st floor. Stall shower with seat/grab bar/hand held shower hose; raised toilet with grab bar)  Prior Function:  Level of [place]:  (Independent ADLs, transfers and mobility with wheeled walker. Wife primarily does IADLs. Patient does not drive.)     ADL:  Eating Assistance: Independent  Grooming Assistance: Independent  Bathing Assistance: Moderate  Bathing Deficit:  (Unable to reach BLE; would benefit from adaptive equipment)  UE Dressing Assistance: Independent  LE Dressing Assistance: Moderate  LE Dressing Deficit:  (Unable to reach BLE, would beneft from adaptive equipment)  Toileting Assistance with Device: Minimal  Functional Assistance:  (Min assist for balance during mobility with wheeled walker)  Activity Tolerance:  Endurance: Decreased tolerance for upright activites (secondary to pain)  Bed Mobility/Transfers:   Bed Mobility:  (Mod assist sit to supine)  Transfer:  (Min assist sit to stand from chair with armrests)      Sensation:  Sensation Comment: Tingling BLE       Extremities:   RUE : Within Functional Limits    LUE: Within Functional Limits        Outcome Measures:[facility] Daily Activity  Putting on and taking off regular lower body clothing: A lot  Bathing (including washing, rinsing, drying): A lot  Putting on and taking off regular upper body clothing: None  Toileting, which includes using toilet, bedpan or urinal: A little  Taking care of personal grooming such as brushing teeth: None  Eating Meals: None  Daily Activity - Total Score: 19        Education Documentation  Precautions, taught by Peg Ambrose OT at 10/3/2023  1:29 PM.  Learner: Patient  Readiness: Acceptance  Method: Explanation  Response: Verbalizes Understanding    ADL Training, taught by Peg Ambrose OT at  10/3/2023  1:29 PM.  Learner: Patient  Readiness: Acceptance  Method: Explanation  Response: Verbalizes Understanding    Education  Individual(s) Educated: Patient  Education Provided: Fall precautons, Risk and benefits of OT discussed with patient or other    Goals:  Encounter Problems       Encounter Problems (Active)       ADLs       LE bathing (Progressing)       Start:  10/03/23    Expected End:  10/17/23       SBA with adaptive equipment PRN         LE dressing (Progressing)       Start:  10/03/23    Expected End:  10/17/23       SBA with adaptive equipment PRN           Toileting (Progressing)       Start:  10/03/23    Expected End:  10/17/23       SBA with adaptive equipment PRN            MOBILITY       Functional mobility (Progressing)       Start:  10/03/23    Expected End:  10/17/23       SBA with DME for safety and compliance to post op precautions and walker safety techs.            TRANSFERS       To/from bed, chair, commode (Progressing)       Start:  10/03/23    Expected End:  10/17/23       With SBA using DME for safety prn

## 2023-10-03 NOTE — PROGRESS NOTES
Danny Bah is a 82 y.o. male on day 0 of admission presenting with Pseudoclaudication syndrome./sp lumbar surgery . Post op day 1       Subjective   Patient did not sleep due to pain,postoperative.  Denies SOB, chest pian, nausea, abdominal distention or weakness .        Objective     Last Recorded Vitals  BP (!) 143/94   Pulse 91   Temp 37.5 °C (99.5 °F)   Resp 19   Wt 97.6 kg (215 lb 2.7 oz)   SpO2 93%   Intake/Output last 3 Shifts:    Intake/Output Summary (Last 24 hours) at 10/3/2023 0749  Last data filed at 10/3/2023 0608  Gross per 24 hour   Intake 3896.67 ml   Output 3440 ml   Net 456.67 ml       Admission Weight  Weight: 97 kg (213 lb 13.5 oz) (09/30/23 1405)    Daily Weight  10/02/23 : 97.6 kg (215 lb 2.7 oz)    Image Results  XR chest 2 view  Narrative: Interpreted By:  TOMÁS MARIE MD  MRN: [id number]  Patient Name: DANNY BAH     STUDY:  TH CHEST 2 VIEW PA AND LAT;  9/26/2023 3:04 pm     INDICATION:  preop .     COMPARISON:  None.     ACCESSION NUMBER(S):  [id number]     ORDERING CLINICIAN:  TEJA PIZARRO     FINDINGS:        CARDIOMEDIASTINAL SILHOUETTE:  Mediastinal contours appear unremarkable.     LUNGS:  No consolidation, pneumothorax, or effusion.     ABDOMEN:  No remarkable upper abdominal findings.     BONES:  No acute osseous changes. Spinal stimulator, lead terminating at the  approximate T9 level.     Impression: 1.  No evidence of acute cardiopulmonary process.         Physical Exam  Constitutional:       Appearance: Normal appearance. He is normal weight.   HENT:      Head: Normocephalic.   Eyes:      Conjunctiva/sclera: Conjunctivae normal.   Cardiovascular:      Rate and Rhythm: Normal rate and regular rhythm.   Pulmonary:      Effort: Pulmonary effort is normal.      Breath sounds: Wheezing present.      Comments: Mildly diminished air entry   Abdominal:      General: Abdomen is flat. Bowel sounds are normal. There is no distension.      Palpations: Abdomen is soft.       Tenderness: There is no abdominal tenderness.   Musculoskeletal:      Cervical back: Normal range of motion.      Right lower leg: No edema.      Left lower leg: No edema.   Neurological:      Mental Status: He is alert.   Psychiatric:         Mood and Affect: Mood normal.         Relevant Results               Assessment/Plan      Medical Problems       Problem List       * (Principal) Pseudoclaudication syndrome    Moderate recurrent major depression (CMS/HCC)    Overview Signed 10/2/2023  5:31 PM by Seda Whitaker MD     Stable on cymbalta         Other specified hypothyroidism    Overview Signed 10/2/2023  5:31 PM by Seda Whitaker MD     On levothyroxine         Glucocorticoid deficiency (CMS/HCC)    Overview Signed 10/3/2023  7:56 AM by Seda Whitaker MD     Will resume hydrocortisone today          Generalized anxiety disorder    Chronic pain    Spinal stenosis of lumbar region at multiple levels    Elevated prostate specific antigen (PSA)    Overview Signed 10/3/2023  7:56 AM by Seda Whitaker MD     Lee removed, will resume tamsulosin bid         Degeneration of lumbar or lumbosacral intervertebral disc    Chronic gout of multiple sites    Other emphysema (CMS/HCC)    Overview Signed 10/3/2023  7:57 AM by Seda Whitaker MD     On albuterol and prn oxygen , stable                         Principal Problem:    Pseudoclaudication syndrome  Active Problems:    Moderate recurrent major depression (CMS/HCC)    Other specified hypothyroidism    Generalized anxiety disorder    Chronic pain                  Seda Whitaker MD

## 2023-10-03 NOTE — PROGRESS NOTES
10/3/2023   Met with pt in room.  PCP- Michael Wyatt  Pharmacy- [facility]  Emergency contact- wife Faith- [phone]  Therapy recommending high intensity. Discussed with pt. Stated he wants to see how he does tomorrow. Stated has been to rehab facility in [place] in the past.   Plan- Acute rehab vs SNF. CT Team will follow.      10/03/23 1633   Discharge Planning   Living Arrangements Spouse/significant other   Support Systems Spouse/significant other   Assistance Needed Walker; walk in shower. Grab bars in bathroom   Type of Residence Private residence   Number of Stairs to Enter Residence 2   Home or Post Acute Services Post acute facilities (Rehab/SNF/etc)   Patient expects to be discharged to: Acute Rehab Vs SNF - pt stated would like to see how he feels tomorrow

## 2023-10-03 NOTE — PROGRESS NOTES
Physical Therapy    Physical Therapy Evaluation    Patient Name: Danny Bah  MRN: [id number]  Today's Date: 10/3/2023   Time Calculation  Start Time: 1058  Stop Time: 1115  Time Calculation (min): 17 min    Assessment/Plan   PT Assessment  PT Assessment Results: Decreased strength, Decreased endurance, Impaired balance, Decreased mobility, Decreased safety awareness  Rehab Prognosis: Good  Evaluation/Treatment Tolerance: Patient limited by pain  End of Session Communication: Care Coordinator  Assessment Comment:  (continued skilled PT intervention indicated to facilitate increased fxl mobilty for safe return home)  IP OR SWING BED PT PLAN  Inpatient or Swing Bed: Swing Bed  PT Plan  Treatment/Interventions: Bed mobility, Transfer training, Gait training, Stair training, Balance training, Strengthening, Endurance training, Therapeutic exercise, Positioning  PT Plan: Skilled PT  PT Frequency: 5 times per week  PT Discharge Recommendations: High intensity level of continued care      Subjective   General Visit Information:  General  Reason for Referral:  (L3-4, L4-5, L5-S1 decompressive laminectomy w/ bilat L5-S1, foraminotomies & L4-5 disc excision; gait training)  Referred By:  Shahid)  Past Medical History Relevant to Rehab:  (moderate recurrent major depression, hypothyroidism, rekha, chronic pain, home O2 2L)  Missed Visit Reason:  (Per conference w/ RN, defer PT eval at this time, pt to have stat CT)  Prior to Session Communication:  (Per conference w/ RN , pt stable to participate in therapy eval)  Patient Position Received:  (up in chair leaning to the left, appeared in discomfort, LSO brace donned)  General Comment:  (a&ox3, cooperative & receptive to mobility although anxious & pain limited)  Home Living:  Home Living  Type of Home:  (Lives w/ spouse & daughter; 2 steps w/ rail to enter [facility] set up; stall shower w/ seat/grab bar/hand held shower hose; raised toilet w/ grab bar)  Prior Level of  Function:  Prior Function Per Pt/Caregiver Report  Level of [place]:  (Independent gait w/ use of ww, h/o 2falls in past 6months due to le's giving out.  Ind ADL. spouse manages IADL & driving)  Precautions:  Precautions  Post-Surgical Precautions:  (spinal precautions, LSO when out of bed)  Precautions Comment:  (fall, 3LO2)         Objective   Pain:  Pain Assessment  Pain Assessment:  (low back/glutes & le pain rated 9-10/10)  Pain Interventions:  (premedicated)                  Activity Tolerance  Endurance: Decreased tolerance for upright activites    Sensation  Sensation Comment:  (ble tingling)  Functional Assessments:  Bed Mobility  Bed Mobility:  (mod assist x1 for ble's sit>supine, cues for body mechanics)    Transfers  Transfer:  (min assist x1 sit<>stand w/ ww, cues for safe hand plcmt)    Ambulation/Gait Training  Ambulation/Gait Training Performed:  (min assist x1 w/ ww 30ft, performed changes in direction & maneuver of obstacles, cues for upright posture/safe position to ww.  Tends toward flexed position)  Extremity/Trunk Assessments:        Outcome Measures:  [facility] Basic Mobility  Turning from your back to your side while in a flat bed without using bedrails: A little  Moving from lying on your back to sitting on the side of a flat bed without using bedrails: A lot  Moving to and from bed to chair (including a wheelchair): A little  Standing up from a chair using your arms (e.g. wheelchair or bedside chair): A little  To walk in hospital room: A little  Climbing 3-5 steps with railing: A lot  Basic Mobility - Total Score: 16    Encounter Problems       Encounter Problems (Active)       PT Problem       bed mobility  (Progressing)       Start:  10/03/23       Modified ind supine<>sit using log roll technique         transfers  (Progressing)       Start:  10/03/23       Modified ind sit<>stand w/ ww          gait (Progressing)       Start:  10/03/23       Modified ind w/ ww >=50ftx2         stairs   (Progressing)       Start:  10/03/23       Cga negotiating >=2 steps simulating home environment                Education Documentation  Mobility Training, taught by Sheree Ortiz, PT at 10/3/2023 12:02 PM.  Learner: Patient  Readiness: Acceptance  Method: Explanation  Response: Verbalizes Understanding    Education Comments  Pt educated re: spinal postop precautions

## 2023-10-03 NOTE — PROGRESS NOTES
Physical Therapy                 Therapy Communication Note    Patient Name: Danny Bah  MRN: [id number]  Today's Date: 10/3/2023     Discipline: Physical Therapy    Missed Visit Reason: Missed Visit Reason:  (Per conference w/ RN, defer PT eval at this time, pt to have stat CT)    Missed Time: Attempt

## 2023-10-03 NOTE — CARE PLAN
The patient's goals for the shift include      The clinical goals for the shift include 3    Over the shift, the patient met goals. Call light within reach. Care plan to continue.

## 2023-10-03 NOTE — CARE PLAN
Problem: PT Problem  Goal: bed mobility   Description: Modified ind supine<>sit using log roll technique  Outcome: Progressing  Goal: transfers   Description: Modified ind sit<>stand w/ ww   Outcome: Progressing  Goal: gait  Description: Modified ind w/ ww >=50ftx2  Outcome: Progressing  Goal: stairs   Description: Cga negotiating >=2 steps simulating home environment  Outcome: Progressing

## 2023-10-03 NOTE — PROGRESS NOTES
Danny Bah is an 82 y.o. male on day 0 of admission presenting with Pseudoclaudication syndrome.    Subjective   Having pain in his back at surgical incision. Also reports pain in LLE along with tingling worse than prior to surgery      Objective   Drain output is sanguineous: 340 mL post op  Lee has been removed this morning    Physical Exam  A&O x 3, speech clear / fluent  BARAJAS in bed. Moves BLE with request  Lumbar incision with clean dressing intact    Last Recorded Vitals  Blood pressure (!) 143/94, pulse 91, temperature 37.5 °C (99.5 °F), resp. rate 19, height 1.829 m (6'), weight 97.6 kg (215 lb 2.7 oz), SpO2 93 %.  Intake/Output last 3 Shifts:  I/O last 3 completed shifts:  In: 2626.7 (26.9 mL/kg) [I.V.:626.7 (6.4 mL/kg); IV Piggyback:2000]  Out: 850 (8.7 mL/kg) [Urine:400 (0.1 mL/kg/hr); Blood:450]  Weight: 97.6 kg     Relevant Results  No results found for this or any previous visit (from the past 24 hour(s)).    Scheduled medications  allopurinol, 300 mg, oral, Daily  docusate sodium, 100 mg, oral, BID  DULoxetine, 60 mg, oral, Daily  hydrALAZINE, 10 mg, oral, TID  levothyroxine, 150 mcg, oral, Daily before breakfast  pantoprazole, 40 mg, oral, Daily before breakfast  potassium chloride CR, 20 mEq, oral, Daily  pregabalin, 50 mg, oral, TID  psyllium husk (with sugar), 5 g, oral, Daily      Continuous medications  lactated Ringer's, 75 mL/hr  sodium chloride 0.9%, 100 mL/hr, Last Rate: 100 mL/hr (10/03/23 0608)      PRN medications  PRN medications: albuterol, cyclobenzaprine, famotidine, morphine, naloxone, ondansetron, ondansetron ODT **OR** ondansetron, oxyCODONE, oxyCODONE, scopolamine        Assessment/Plan   Principal Problem:    Pseudoclaudication syndrome  Active Problems:    Moderate recurrent major depression (CMS/HCC)    Other specified hypothyroidism    Generalized anxiety disorder    Chronic pain    POD#1 L3-L5 decompression by Dr. Geronimo Vasquez. Patient seen and examined with   Pedro:  - Continue surgical drain for now  - CT L Spine now, concern for hematoma as cause of worsened pain  - Increase oxycodone to 10 mg po Q6 h PRN severe pain  - PT/OT for discharge planning - will likely need rehab / SNF at discharge  - Appreciate consultants' assist with post operative care.  Patricia Alarcon, APRN-CNP

## 2023-10-03 NOTE — CARE PLAN
Problem: Fall/Injury  Goal: Not fall by end of shift  Outcome: Progressing  Goal: Be free from injury by end of the shift  Outcome: Progressing  Goal: Verbalize understanding of personal risk factors for fall in the hospital  Outcome: Progressing  Goal: Verbalize understanding of risk factor reduction measures to prevent injury from fall in the home  Outcome: Progressing  Goal: Use assistive devices by end of the shift  Outcome: Progressing  Goal: Pace activities to prevent fatigue by end of the shift  Outcome: Progressing     Problem: Pain  Goal: My pain/discomfort is manageable  Outcome: Progressing     Problem: Safety  Goal: Patient will be injury free during hospitalization  Outcome: Progressing  Goal: I will remain free of falls  Outcome: Progressing     Problem: Daily Care  Goal: Daily care needs are met  Outcome: Progressing     Problem: Psychosocial Needs  Goal: Demonstrates ability to cope with hospitalization/illness  Outcome: Progressing  Goal: Collaborate with me, my family, and caregiver to identify my specific goals  Outcome: Progressing     Problem: Discharge Barriers  Goal: My discharge needs are met  Outcome: Progressing     Problem: Infection related to problem list condition  Goal: Infection will resolve through treatment  Outcome: Progressing   The patient's goals for the shift include      The clinical goals for the shift include pain control

## 2023-10-04 PROCEDURE — 97535 SELF CARE MNGMENT TRAINING: CPT | Mod: CQ,GP

## 2023-10-04 PROCEDURE — 2500000001 HC RX 250 WO HCPCS SELF ADMINISTERED DRUGS (ALT 637 FOR MEDICARE OP): Performed by: CLINICAL NURSE SPECIALIST

## 2023-10-04 PROCEDURE — 2500000001 HC RX 250 WO HCPCS SELF ADMINISTERED DRUGS (ALT 637 FOR MEDICARE OP): Performed by: NEUROLOGICAL SURGERY

## 2023-10-04 PROCEDURE — 97535 SELF CARE MNGMENT TRAINING: CPT | Mod: CO,GO

## 2023-10-04 PROCEDURE — 2500000001 HC RX 250 WO HCPCS SELF ADMINISTERED DRUGS (ALT 637 FOR MEDICARE OP): Performed by: INTERNAL MEDICINE

## 2023-10-04 PROCEDURE — 2500000004 HC RX 250 GENERAL PHARMACY W/ HCPCS (ALT 636 FOR OP/ED): Performed by: CLINICAL NURSE SPECIALIST

## 2023-10-04 PROCEDURE — 97116 GAIT TRAINING THERAPY: CPT | Mod: CQ,GP

## 2023-10-04 PROCEDURE — 7100000011 HC EXTENDED STAY RECOVERY HOURLY - NURSING UNIT

## 2023-10-04 PROCEDURE — 2500000004 HC RX 250 GENERAL PHARMACY W/ HCPCS (ALT 636 FOR OP/ED): Performed by: INTERNAL MEDICINE

## 2023-10-04 PROCEDURE — 2500000001 HC RX 250 WO HCPCS SELF ADMINISTERED DRUGS (ALT 637 FOR MEDICARE OP): Performed by: NURSE PRACTITIONER

## 2023-10-04 RX ADMIN — HYDRALAZINE HYDROCHLORIDE 10 MG: 10 TABLET, FILM COATED ORAL at 20:58

## 2023-10-04 RX ADMIN — OXYCODONE HYDROCHLORIDE 10 MG: 5 TABLET ORAL at 18:55

## 2023-10-04 RX ADMIN — HYDRALAZINE HYDROCHLORIDE 10 MG: 10 TABLET, FILM COATED ORAL at 15:46

## 2023-10-04 RX ADMIN — PREGABALIN 50 MG: 50 CAPSULE ORAL at 20:57

## 2023-10-04 RX ADMIN — POTASSIUM CHLORIDE 20 MEQ: 1500 TABLET, EXTENDED RELEASE ORAL at 09:32

## 2023-10-04 RX ADMIN — HYDROCORTISONE 10 MG: 10 TABLET ORAL at 09:33

## 2023-10-04 RX ADMIN — LEVOTHYROXINE SODIUM 150 MCG: 150 TABLET ORAL at 07:00

## 2023-10-04 RX ADMIN — DOCUSATE SODIUM 100 MG: 100 CAPSULE, LIQUID FILLED ORAL at 09:00

## 2023-10-04 RX ADMIN — HYDRALAZINE HYDROCHLORIDE 10 MG: 10 TABLET, FILM COATED ORAL at 09:33

## 2023-10-04 RX ADMIN — PREGABALIN 50 MG: 50 CAPSULE ORAL at 09:32

## 2023-10-04 RX ADMIN — Medication 5 G: at 09:00

## 2023-10-04 RX ADMIN — PREGABALIN 50 MG: 50 CAPSULE ORAL at 15:47

## 2023-10-04 RX ADMIN — PANTOPRAZOLE SODIUM 40 MG: 40 TABLET, DELAYED RELEASE ORAL at 07:00

## 2023-10-04 RX ADMIN — DULOXETINE HYDROCHLORIDE 60 MG: 30 CAPSULE, DELAYED RELEASE ORAL at 09:33

## 2023-10-04 RX ADMIN — MORPHINE SULFATE 30 MG: 30 TABLET, EXTENDED RELEASE ORAL at 09:32

## 2023-10-04 RX ADMIN — TAMSULOSIN HYDROCHLORIDE 0.4 MG: 0.4 CAPSULE ORAL at 09:32

## 2023-10-04 RX ADMIN — FUROSEMIDE 20 MG: 20 TABLET ORAL at 09:33

## 2023-10-04 RX ADMIN — TAMSULOSIN HYDROCHLORIDE 0.4 MG: 0.4 CAPSULE ORAL at 20:57

## 2023-10-04 RX ADMIN — MORPHINE SULFATE 30 MG: 30 TABLET, EXTENDED RELEASE ORAL at 20:57

## 2023-10-04 RX ADMIN — ALLOPURINOL 300 MG: 300 TABLET ORAL at 09:33

## 2023-10-04 RX ADMIN — DOCUSATE SODIUM 100 MG: 100 CAPSULE, LIQUID FILLED ORAL at 20:57

## 2023-10-04 RX ADMIN — OXYCODONE HYDROCHLORIDE 10 MG: 5 TABLET ORAL at 05:52

## 2023-10-04 ASSESSMENT — COGNITIVE AND FUNCTIONAL STATUS - GENERAL
MOBILITY SCORE: 18
CLIMB 3 TO 5 STEPS WITH RAILING: A LITTLE
STANDING UP FROM CHAIR USING ARMS: A LITTLE
HELP NEEDED FOR BATHING: A LOT
DRESSING REGULAR LOWER BODY CLOTHING: A LOT
TOILETING: A LOT
TURNING FROM BACK TO SIDE WHILE IN FLAT BAD: A LITTLE
WALKING IN HOSPITAL ROOM: A LITTLE
MOVING FROM LYING ON BACK TO SITTING ON SIDE OF FLAT BED WITH BEDRAILS: A LITTLE
MOVING TO AND FROM BED TO CHAIR: A LITTLE
DRESSING REGULAR UPPER BODY CLOTHING: A LITTLE
DAILY ACTIVITIY SCORE: 17

## 2023-10-04 ASSESSMENT — PAIN - FUNCTIONAL ASSESSMENT
PAIN_FUNCTIONAL_ASSESSMENT: 0-10
PAIN_FUNCTIONAL_ASSESSMENT: 0-10

## 2023-10-04 ASSESSMENT — PAIN SCALES - GENERAL
PAINLEVEL_OUTOF10: 10 - WORST POSSIBLE PAIN
PAINLEVEL_OUTOF10: 5 - MODERATE PAIN
PAINLEVEL_OUTOF10: 0 - NO PAIN
PAINLEVEL_OUTOF10: 0 - NO PAIN
PAINLEVEL_OUTOF10: 7

## 2023-10-04 ASSESSMENT — ACTIVITIES OF DAILY LIVING (ADL): HOME_MANAGEMENT_TIME_ENTRY: 24

## 2023-10-04 NOTE — PROGRESS NOTES
Physical Therapy    Physical Therapy Treatment    Patient Name: Danny Bah  MRN: [id number]  Today's Date: 10/4/2023  Time Calculation  Start Time: 0831  Stop Time: 0901  Time Calculation (min): 30 min       Assessment/Plan   PT Assessment  PT Assessment Results: Decreased strength, Decreased endurance, Impaired balance, Decreased mobility, Decreased safety awareness  Rehab Prognosis: Good  Evaluation/Treatment Tolerance: Patient limited by pain  End of Session Communication: Care Coordinator  Assessment Comment:  (continued skilled PT intervention indicated to facilitate increased fxl mobilty for safe return home)     PT Plan  Treatment/Interventions: Bed mobility, Transfer training, Gait training, Stair training, Balance training, Strengthening, Endurance training, Therapeutic exercise, Positioning  PT Plan: Skilled PT  PT Frequency: 5 times per week  PT Discharge Recommendations: High intensity level of continued care      General Visit Information:   PT  Visit  PT Received On: 10/04/23       Subjective   Precautions:  Precautions  Post-Surgical Precautions:  (spinal precautions, LSO when out of bed)  Precautions Comment:  (fall, 3LO2)  Vital Signs:       Objective                  Treatments:  Bed Mobility  Bed Mobility:  (SUPINE TO SIT SBA)    Ambulation/Gait Training  Ambulation/Gait Training Performed:  ([id number] feet x 2 using fixed wheeled walker min a x1 wearing back brace)  Transfers  Transfer:  (sit to stand min a x 1)    Stairs  Stairs:  (ascended/descended 4 steps using 2 rails min a x 1)    Outcome Measures:  [facility] Basic Mobility  Turning from your back to your side while in a flat bed without using bedrails: A little  Moving from lying on your back to sitting on the side of a flat bed without using bedrails: A little  Moving to and from bed to chair (including a wheelchair): A little  Standing up from a chair using your arms (e.g. wheelchair or bedside chair): A little  To walk in hospital room:  A little  Climbing 3-5 steps with railing: A little  Basic Mobility - Total Score: 18    Education Documentation  Precautions, taught by Breann Sharma PTA at 10/4/2023 12:33 PM.  Learner: Patient  Readiness: Acceptance  Method: Demonstration  Response: Demonstrated Understanding    ADL Training, taught by Breann Sharma PTA at 10/4/2023 12:33 PM.  Learner: Patient  Readiness: Acceptance  Method: Demonstration  Response: Demonstrated Understanding    Mobility Training, taught by Breann Sharma PTA at 10/4/2023 12:33 PM.  Learner: Patient  Readiness: Acceptance  Method: Demonstration  Response: Demonstrated Understanding    Education Comments  No comments found.        OP EDUCATION:       Encounter Problems       Encounter Problems (Active)       PT Problem       bed mobility  (Progressing)       Start:  10/03/23       Modified ind supine<>sit using log roll technique         transfers  (Progressing)       Start:  10/03/23       Modified ind sit<>stand w/ ww          gait (Progressing)       Start:  10/03/23       Modified ind w/ ww >=50ftx2         stairs  (Progressing)       Start:  10/03/23       Cga negotiating >=2 steps simulating home environment

## 2023-10-04 NOTE — PROGRESS NOTES
Occupational Therapy    Occupational Therapy Treatment    Name: Danny Bah  MRN: [id number]  : 1940  Date: 10/04/23  Time Calculation  Start Time: 0816  Stop Time: 0840  Time Calculation (min): 24 min                                 Objective   Activities of Daily Living: UE Dressing  UE Dressing Comments: pt. able to don back brace with min a whle seated at e.o.b.  Instructed  pt. In adaptive equipment use for increased ind. With adl's.  Pt. Able to thread legs through pants with min a using ae and required min for balance while standing to hike pants over hips.            Bed Mobility/Transfers: Bed Mobility  Bed Mobility:  (pt. able to move supine to sit with cga using log rolling tech.)    Transfers  Transfer:  (pt. able to complete bed to bathroom to chair transfer with min a using a wheeled walker.)                                                    Outcome Measures:  [facility] Daily Activity  Putting on and taking off regular lower body clothing: A lot  Bathing (including washing, rinsing, drying): A lot  Putting on and taking off regular upper body clothing: A little  Toileting, which includes using toilet, bedpan or urinal: A lot  Taking care of personal grooming such as brushing teeth: None  Eating Meals: None  Daily Activity - Total Score: 17        Education Documentation  Precautions, taught by ROSIO Felipe at 10/4/2023 11:14 AM.  Learner: Patient  Readiness: Acceptance  Method: Explanation, Demonstration  Response: Verbalizes Understanding, Demonstrated Understanding    ADL Training, taught by ROSIO Felipe at 10/4/2023 11:14 AM.  Learner: Patient  Readiness: Acceptance  Method: Explanation, Demonstration  Response: Verbalizes Understanding, Demonstrated Understanding    Mobility Training, taught by ROSIO Feilpe at 10/4/2023 11:14 AM.  Learner: Patient  Readiness: Acceptance  Method: Explanation, Demonstration  Response: Verbalizes Understanding, Demonstrated  Understanding    Education Comments  No comments found.

## 2023-10-04 NOTE — CARE PLAN
Problem: Fall/Injury  Goal: Not fall by end of shift  Outcome: Progressing  Goal: Be free from injury by end of the shift  Outcome: Progressing  Goal: Verbalize understanding of personal risk factors for fall in the hospital  Outcome: Progressing  Goal: Verbalize understanding of risk factor reduction measures to prevent injury from fall in the home  Outcome: Progressing  Goal: Use assistive devices by end of the shift  Outcome: Progressing  Goal: Pace activities to prevent fatigue by end of the shift  Outcome: Progressing     Problem: Pain  Goal: My pain/discomfort is manageable  Outcome: Progressing     Problem: Safety  Goal: Patient will be injury free during hospitalization  Outcome: Progressing  Goal: I will remain free of falls  Outcome: Progressing     Problem: Daily Care  Goal: Daily care needs are met  Outcome: Progressing     Problem: Psychosocial Needs  Goal: Demonstrates ability to cope with hospitalization/illness  Outcome: Progressing  Goal: Collaborate with me, my family, and caregiver to identify my specific goals  Outcome: Progressing     Problem: Discharge Barriers  Goal: My discharge needs are met  Outcome: Progressing     Problem: Infection related to problem list condition  Goal: Infection will resolve through treatment  Outcome: Progressing   The patient's goals for the shift include      The clinical goals for the shift include pain control    Over the shift, the patient did not make progress toward the following goals. Barriers to progression include back sx. Recommendations to address these barriers include med mngtt.

## 2023-10-04 NOTE — PROGRESS NOTES
Nsg notes that family would like a listing of placement facilities. Therapies recommending high frequency therapy. Generated listing of ARF that were in network for pt.   Met with pt., spouse & dtr in his room. He verbalizes agreement with family being present for discussions. Discussed therapies' recommendation with them & provided educatjion on ARF placement vs. SNF placement to them.   After discussion, pt would like to pursue placement @ [facility] in [facility] & [facility] in [facility]. Explained placement process to them. They verbalized understanding. Answered additional questions & provided support.   Have requested that [facility] send referrals. Updated [facility] & nsg. JO ANN Harrison   Spouse notes contact #'s as [phone] (h); [phone] (c). JO ANN Harrison

## 2023-10-04 NOTE — PROGRESS NOTES
Danny Bah is an 82 y.o. male who is POD#2 L3 - 5 decompression by Dr. Geronimo Vasquez.    Reported non-positional headache last evening. Suction removed from drain per my instructions. He reports that his headache is better this morning (with HOB elevated). He states that he feels, overall better since restarting baseline pain med (oral morphine).     Blood pressure 163/77, pulse 78, temperature 37.5 °C (99.5 °F), temperature source Temporal, resp. rate 20, height 1.829 m (6'), weight 97.6 kg (215 lb 2.7 oz), SpO2 95 %.    Recent labs reviewed.  CT L Spine on 10/03/2023, without acute findings   Appreciate therapists and Home Care input - recommending intensive therapy at discharge    On exam:  A&O x 3, speech clear / fluent  Respirations even / unlabores  BARAJAS readily; adjusts self in bed  SURGICAL INCISION is: well approximated edges,, no erythema / swelling / drainage  Drain Removed without incident  Voiding QS  Tolerating oral intake well    Danny Bah is progressing well on POD#2  -Await authorization for discharge to SNF for rehab   -Continue PT / OT for discharge planning  -Appreciate consultants' assist with post op care  Patricia Alarcon, APRN-CNP

## 2023-10-05 PROCEDURE — 97535 SELF CARE MNGMENT TRAINING: CPT | Mod: CO,GO

## 2023-10-05 PROCEDURE — 2500000001 HC RX 250 WO HCPCS SELF ADMINISTERED DRUGS (ALT 637 FOR MEDICARE OP): Performed by: NEUROLOGICAL SURGERY

## 2023-10-05 PROCEDURE — 7100000011 HC EXTENDED STAY RECOVERY HOURLY - NURSING UNIT

## 2023-10-05 PROCEDURE — 97110 THERAPEUTIC EXERCISES: CPT | Mod: CQ,GP

## 2023-10-05 PROCEDURE — 2500000001 HC RX 250 WO HCPCS SELF ADMINISTERED DRUGS (ALT 637 FOR MEDICARE OP): Performed by: NURSE PRACTITIONER

## 2023-10-05 PROCEDURE — 2500000004 HC RX 250 GENERAL PHARMACY W/ HCPCS (ALT 636 FOR OP/ED): Performed by: NEUROLOGICAL SURGERY

## 2023-10-05 PROCEDURE — 2500000001 HC RX 250 WO HCPCS SELF ADMINISTERED DRUGS (ALT 637 FOR MEDICARE OP): Performed by: CLINICAL NURSE SPECIALIST

## 2023-10-05 PROCEDURE — 2500000004 HC RX 250 GENERAL PHARMACY W/ HCPCS (ALT 636 FOR OP/ED): Performed by: INTERNAL MEDICINE

## 2023-10-05 PROCEDURE — 2500000001 HC RX 250 WO HCPCS SELF ADMINISTERED DRUGS (ALT 637 FOR MEDICARE OP): Performed by: INTERNAL MEDICINE

## 2023-10-05 PROCEDURE — 97116 GAIT TRAINING THERAPY: CPT | Mod: CQ,GP

## 2023-10-05 PROCEDURE — 2500000004 HC RX 250 GENERAL PHARMACY W/ HCPCS (ALT 636 FOR OP/ED): Performed by: CLINICAL NURSE SPECIALIST

## 2023-10-05 RX ORDER — NALOXONE HYDROCHLORIDE 1 MG/ML
0.2 INJECTION INTRAMUSCULAR; INTRAVENOUS; SUBCUTANEOUS EVERY 5 MIN PRN
Qty: 4 ML | Status: SHIPPED | OUTPATIENT
Start: 2023-10-05

## 2023-10-05 RX ORDER — OXYCODONE HYDROCHLORIDE 10 MG/1
10 TABLET ORAL EVERY 6 HOURS PRN
Qty: 15 TABLET | Refills: 0
Start: 2023-10-05 | End: 2023-10-16 | Stop reason: ALTCHOICE

## 2023-10-05 RX ORDER — OXYCODONE HYDROCHLORIDE 5 MG/1
5 TABLET ORAL EVERY 4 HOURS PRN
Qty: 15 TABLET | Refills: 0 | Status: SHIPPED | OUTPATIENT
Start: 2023-10-05 | End: 2023-10-16 | Stop reason: ALTCHOICE

## 2023-10-05 RX ORDER — MORPHINE SULFATE 30 MG/1
30 TABLET, FILM COATED, EXTENDED RELEASE ORAL EVERY 12 HOURS SCHEDULED
Qty: 20 TABLET | Refills: 0 | Status: SHIPPED | OUTPATIENT
Start: 2023-10-05 | End: 2023-10-15

## 2023-10-05 RX ADMIN — HYDRALAZINE HYDROCHLORIDE 10 MG: 10 TABLET, FILM COATED ORAL at 16:46

## 2023-10-05 RX ADMIN — POTASSIUM CHLORIDE 20 MEQ: 1500 TABLET, EXTENDED RELEASE ORAL at 09:42

## 2023-10-05 RX ADMIN — PREGABALIN 50 MG: 50 CAPSULE ORAL at 21:05

## 2023-10-05 RX ADMIN — DULOXETINE HYDROCHLORIDE 60 MG: 30 CAPSULE, DELAYED RELEASE ORAL at 08:09

## 2023-10-05 RX ADMIN — Medication 5 G: at 09:00

## 2023-10-05 RX ADMIN — TAMSULOSIN HYDROCHLORIDE 0.4 MG: 0.4 CAPSULE ORAL at 09:53

## 2023-10-05 RX ADMIN — CYCLOBENZAPRINE 10 MG: 10 TABLET, FILM COATED ORAL at 08:09

## 2023-10-05 RX ADMIN — FUROSEMIDE 20 MG: 20 TABLET ORAL at 08:10

## 2023-10-05 RX ADMIN — PREGABALIN 50 MG: 50 CAPSULE ORAL at 09:42

## 2023-10-05 RX ADMIN — LEVOTHYROXINE SODIUM 150 MCG: 150 TABLET ORAL at 06:42

## 2023-10-05 RX ADMIN — DOCUSATE SODIUM 100 MG: 100 CAPSULE, LIQUID FILLED ORAL at 21:04

## 2023-10-05 RX ADMIN — TAMSULOSIN HYDROCHLORIDE 0.4 MG: 0.4 CAPSULE ORAL at 21:04

## 2023-10-05 RX ADMIN — PREGABALIN 50 MG: 50 CAPSULE ORAL at 15:00

## 2023-10-05 RX ADMIN — HYDRALAZINE HYDROCHLORIDE 10 MG: 10 TABLET, FILM COATED ORAL at 21:04

## 2023-10-05 RX ADMIN — MORPHINE SULFATE 2 MG: 2 INJECTION, SOLUTION INTRAMUSCULAR; INTRAVENOUS at 21:08

## 2023-10-05 RX ADMIN — MORPHINE SULFATE 30 MG: 30 TABLET, EXTENDED RELEASE ORAL at 09:53

## 2023-10-05 RX ADMIN — HYDRALAZINE HYDROCHLORIDE 10 MG: 10 TABLET, FILM COATED ORAL at 08:09

## 2023-10-05 RX ADMIN — DOCUSATE SODIUM 100 MG: 100 CAPSULE, LIQUID FILLED ORAL at 08:09

## 2023-10-05 RX ADMIN — MORPHINE SULFATE 2 MG: 2 INJECTION, SOLUTION INTRAMUSCULAR; INTRAVENOUS at 08:33

## 2023-10-05 RX ADMIN — HYDROCORTISONE 10 MG: 10 TABLET ORAL at 08:10

## 2023-10-05 RX ADMIN — PANTOPRAZOLE SODIUM 40 MG: 40 TABLET, DELAYED RELEASE ORAL at 06:42

## 2023-10-05 RX ADMIN — MORPHINE SULFATE 2 MG: 2 INJECTION, SOLUTION INTRAMUSCULAR; INTRAVENOUS at 16:50

## 2023-10-05 RX ADMIN — OXYCODONE HYDROCHLORIDE 10 MG: 5 TABLET ORAL at 06:42

## 2023-10-05 RX ADMIN — OXYCODONE HYDROCHLORIDE 10 MG: 5 TABLET ORAL at 13:26

## 2023-10-05 RX ADMIN — MORPHINE SULFATE 30 MG: 30 TABLET, EXTENDED RELEASE ORAL at 21:06

## 2023-10-05 RX ADMIN — ALLOPURINOL 300 MG: 300 TABLET ORAL at 08:10

## 2023-10-05 ASSESSMENT — PAIN SCALES - GENERAL
PAINLEVEL_OUTOF10: 5 - MODERATE PAIN
PAINLEVEL_OUTOF10: 8
PAINLEVEL_OUTOF10: 8
PAINLEVEL_OUTOF10: 10 - WORST POSSIBLE PAIN
PAINLEVEL_OUTOF10: 8
PAINLEVEL_OUTOF10: 10 - WORST POSSIBLE PAIN
PAINLEVEL_OUTOF10: 8
PAINLEVEL_OUTOF10: 9

## 2023-10-05 ASSESSMENT — COGNITIVE AND FUNCTIONAL STATUS - GENERAL
MOVING TO AND FROM BED TO CHAIR: A LITTLE
MOVING TO AND FROM BED TO CHAIR: A LITTLE
WALKING IN HOSPITAL ROOM: A LITTLE
TOILETING: A LOT
DRESSING REGULAR UPPER BODY CLOTHING: A LITTLE
TURNING FROM BACK TO SIDE WHILE IN FLAT BAD: A LOT
MOBILITY SCORE: 16
STANDING UP FROM CHAIR USING ARMS: A LITTLE
MOVING FROM LYING ON BACK TO SITTING ON SIDE OF FLAT BED WITH BEDRAILS: A LITTLE
PERSONAL GROOMING: A LITTLE
WALKING IN HOSPITAL ROOM: A LITTLE
TURNING FROM BACK TO SIDE WHILE IN FLAT BAD: A LOT
MOVING FROM LYING ON BACK TO SITTING ON SIDE OF FLAT BED WITH BEDRAILS: A LITTLE
DRESSING REGULAR LOWER BODY CLOTHING: A LOT
DAILY ACTIVITIY SCORE: 16
CLIMB 3 TO 5 STEPS WITH RAILING: A LOT
HELP NEEDED FOR BATHING: A LOT
STANDING UP FROM CHAIR USING ARMS: A LITTLE

## 2023-10-05 ASSESSMENT — ACTIVITIES OF DAILY LIVING (ADL): HOME_MANAGEMENT_TIME_ENTRY: 25

## 2023-10-05 ASSESSMENT — PAIN - FUNCTIONAL ASSESSMENT: PAIN_FUNCTIONAL_ASSESSMENT: 0-10

## 2023-10-05 NOTE — PROGRESS NOTES
Danny Bah is a 82 y.o. male on day 0 of admission presenting with Pseudoclaudication syndrome.    Subjective   Patient is  doing better, pain is 5-6/10 ( so improved since yesterday) ; he has difficulty getting out of bed on his own.   No new issues today        Objective     Physical Exam  Constitutional:       Appearance: Normal appearance. He is normal weight.   HENT:      Head: Normocephalic.   Eyes:      Conjunctiva/sclera: Conjunctivae normal.   Cardiovascular:      Rate and Rhythm: Normal rate and regular rhythm.   Pulmonary:      Effort: Pulmonary effort is normal.   Abdominal:      General: Abdomen is flat. Bowel sounds are normal. There is no distension.      Palpations: Abdomen is soft.      Tenderness: There is no abdominal tenderness.   Musculoskeletal:      Cervical back: Normal range of motion.      Right lower leg: No edema.      Left lower leg: No edema.   Neurological:      Mental Status: He is alert.   Psychiatric:         Mood and Affect: Mood normal.         Last Recorded Vitals  Blood pressure 127/74, pulse 85, temperature 37.2 °C (99 °F), temperature source Temporal, resp. rate 18, height 1.829 m (6'), weight 97.6 kg (215 lb 2.7 oz), SpO2 93 %.  Intake/Output last 3 Shifts:  I/O last 3 completed shifts:  In: - (0 mL/kg)   Out: 2380 (24.4 mL/kg) [Urine:2350 (0.7 mL/kg/hr); Drains:30]  Weight: 97.6 kg     Relevant Results                             Assessment/Plan   Medical Problems       Problem List       * (Principal) Pseudoclaudication syndrome    Moderate recurrent major depression (CMS/HCC)    Overview Signed 10/2/2023  5:31 PM by Seda Whitaker MD     Stable on cymbalta         Other specified hypothyroidism    Overview Signed 10/2/2023  5:31 PM by Seda Whitaker MD     On levothyroxine         Glucocorticoid deficiency (CMS/HCC)    Overview Signed 10/3/2023  7:56 AM by Seda Whitaker MD     Will resume hydrocortisone today          Generalized  anxiety disorder    Chronic pain    Spinal stenosis of lumbar region at multiple levels    Elevated prostate specific antigen (PSA)    Overview Signed 10/3/2023  7:56 AM by Seda Whitaker MD     Lee removed, will resume tamsulosin bid         Degeneration of lumbar or lumbosacral intervertebral disc    Chronic gout of multiple sites    Other emphysema (CMS/HCC)    Overview Signed 10/3/2023  7:57 AM by Seda Whitaker MD     On albuterol and prn oxygen , stable               Plan for SNIF discharged for continued PT        I spent 25 minutes in the professional and overall care of this patient.      Seda Whitaker MD

## 2023-10-05 NOTE — PROGRESS NOTES
Physical Therapy    Physical Therapy Treatment    Patient Name: Danny Bah  MRN: [id number]  Today's Date: 10/5/2023  Time Calculation  Start Time: 1101  Stop Time: 1131  Time Calculation (min): 30 min       Assessment/Plan   PT Assessment  PT Assessment Results: Decreased strength, Decreased endurance, Impaired balance, Decreased mobility, Decreased safety awareness  Rehab Prognosis: Good  Evaluation/Treatment Tolerance: Patient limited by pain  End of Session Communication: Care Coordinator  Assessment Comment:  (continued skilled PT intervention indicated to facilitate increased fxl mobilty for safe return home)     PT Plan  Treatment/Interventions: Bed mobility, Transfer training, Gait training, Stair training, Balance training, Strengthening, Endurance training, Therapeutic exercise, Positioning  PT Plan: Skilled PT  PT Frequency: 5 times per week  PT Discharge Recommendations: High intensity level of continued care      General Visit Information:   PT  Visit  PT Received On: 10/05/23       Subjective   Precautions:  Precautions  Post-Surgical Precautions:  (spinal precautions, LSO when out of bed)  Precautions Comment:  (fall, 3LO2)       Objective   Pain:6/10 back                   Treatments:  Therapeutic Exercise  Therapeutic Exercise Performed:  (ble ap's, laqs,marching, abd/add x 15 reps sitting)    Bed Mobility  Bed Mobility:  (supine to sit min/mod a x 1)    Ambulation/Gait Training  Ambulation/Gait Training Performed:  (ambulated 40 feet x 2 using fixed wheeled walker min a x 1 and chair follow wearing back brace)  Transfers  Transfer:  (sit to stand min/mod a x 1)    Outcome Measures:  [facility] Basic Mobility  Turning from your back to your side while in a flat bed without using bedrails: A little  Moving from lying on your back to sitting on the side of a flat bed without using bedrails: A lot  Moving to and from bed to chair (including a wheelchair): A little  Standing up from a chair using your arms  (e.g. wheelchair or bedside chair): A little  To walk in hospital room: A little  Climbing 3-5 steps with railing: A lot  Basic Mobility - Total Score: 16    Education Documentation  Precautions, taught by Breann Sharma PTA at 10/5/2023 12:06 PM.  Learner: Patient  Readiness: Acceptance  Method: Demonstration  Response: Demonstrated Understanding    ADL Training, taught by Breann Sharma PTA at 10/5/2023 12:06 PM.  Learner: Patient  Readiness: Acceptance  Method: Demonstration  Response: Demonstrated Understanding    Mobility Training, taught by Breann Sharma PTA at 10/5/2023 12:06 PM.  Learner: Patient  Readiness: Acceptance  Method: Demonstration  Response: Demonstrated Understanding    Precautions, taught by Breann Sharma PTA at 10/4/2023 12:33 PM.  Learner: Patient  Readiness: Acceptance  Method: Demonstration  Response: Demonstrated Understanding    ADL Training, taught by Breann Sharma PTA at 10/4/2023 12:33 PM.  Learner: Patient  Readiness: Acceptance  Method: Demonstration  Response: Demonstrated Understanding    Mobility Training, taught by Breann Sharma PTA at 10/4/2023 12:33 PM.  Learner: Patient  Readiness: Acceptance  Method: Demonstration  Response: Demonstrated Understanding    Education Comments  No comments found.           Encounter Problems       Encounter Problems (Active)       PT Problem       bed mobility  (Progressing)       Start:  10/03/23       Modified ind supine<>sit using log roll technique         transfers  (Progressing)       Start:  10/03/23       Modified ind sit<>stand w/ ww          gait (Progressing)       Start:  10/03/23       Modified ind w/ ww >=50ftx2         stairs  (Not Progressing)       Start:  10/03/23       Cga negotiating >=2 steps simulating home environment

## 2023-10-05 NOTE — PROGRESS NOTES
Patient was accepted to both [facility] and [facility], Call made to patients spouse who reports [facility] is the FOC. Request sent to the Care Transition Auth team to start pre-cert today. Patient is a written discharge. Will await auth prior to scheduling transportation. Yolanda Aceves RN

## 2023-10-05 NOTE — PROGRESS NOTES
Occupational Therapy    OT Treatment    Patient Name: Danny Bah  MRN: [id number]  Today's Date: 10/5/2023  Time Calculation  Start Time: 0825  Stop Time: 0850  Time Calculation (min): 25 min         Assessment:        Plan:          Subjective   General:        Vital Signs:     Pain:       Objective    Activities of Daily Living: Grooming  Grooming Level of Assistance:  (pt. able to complete oral hygiene ind. after set-up.)    UE Bathing  UE Bathing Level of Assistance:  (pt. able to complete upper body bathing with min a after set-up.)    LE Bathing  LE Bathing Level of Assistance:  (pt. required mod a for standing aspects of lower body adl's, also requiring mod a for balance.)  Functional Standing Tolerance:  Time:  (pt. tolerated 2.0 min. of static stand balance with mod a using walker for support.)               Outcome Measures:[facility] Daily Activity  Putting on and taking off regular lower body clothing: A lot  Bathing (including washing, rinsing, drying): A lot  Putting on and taking off regular upper body clothing: A little  Toileting, which includes using toilet, bedpan or urinal: A lot  Taking care of personal grooming such as brushing teeth: A little  Eating Meals: None  Daily Activity - Total Score: 16        Education Documentation  Precautions, taught by ROSIO Felipe at 10/5/2023 11:27 AM.  Learner: Patient  Readiness: Acceptance  Method: Explanation  Response: Verbalizes Understanding    ADL Training, taught by ROSIO Felipe at 10/5/2023 11:27 AM.  Learner: Patient  Readiness: Acceptance  Method: Explanation  Response: Verbalizes Understanding    Mobility Training, taught by ROSIO Felipe at 10/5/2023 11:27 AM.  Learner: Patient  Readiness: Acceptance  Method: Explanation  Response: Verbalizes Understanding    Education Comments  No comments found.        EDUCATION:  Education  Individual(s) Educated: Patient  Education Provided: Fall precautons, Risk and benefits of OT discussed  with patient or other    Goals:  Encounter Problems       Encounter Problems (Active)       ADLs       LE bathing (Progressing)       Start:  10/03/23    Expected End:  10/17/23       SBA with adaptive equipment PRN         LE dressing (Progressing)       Start:  10/03/23    Expected End:  10/17/23       SBA with adaptive equipment PRN           Toileting (Progressing)       Start:  10/03/23    Expected End:  10/17/23       SBA with adaptive equipment PRN            MOBILITY       Functional mobility (Progressing)       Start:  10/03/23    Expected End:  10/17/23       SBA with DME for safety and compliance to post op precautions and walker safety techs.            TRANSFERS       To/from bed, chair, commode (Progressing)       Start:  10/03/23    Expected End:  10/17/23       With SBA using DME for safety prn

## 2023-10-05 NOTE — PROGRESS NOTES
Spoke with [facility].   Paperwork has been sent to [facility]. [facility], [facility] can accept pt.   Pt. Has been made aware. Spouse called & apprised. Informed that precert is pending.   [facility] & nsg. Updated. JO ANN Harrison

## 2023-10-06 LAB — SARS-COV-2 RNA RESP QL NAA+PROBE: NOT DETECTED

## 2023-10-06 PROCEDURE — 87635 SARS-COV-2 COVID-19 AMP PRB: CPT | Performed by: NEUROLOGICAL SURGERY

## 2023-10-06 PROCEDURE — 2500000004 HC RX 250 GENERAL PHARMACY W/ HCPCS (ALT 636 FOR OP/ED): Performed by: NEUROLOGICAL SURGERY

## 2023-10-06 PROCEDURE — 97530 THERAPEUTIC ACTIVITIES: CPT | Mod: GP

## 2023-10-06 PROCEDURE — 7100000011 HC EXTENDED STAY RECOVERY HOURLY - NURSING UNIT

## 2023-10-06 PROCEDURE — 2500000001 HC RX 250 WO HCPCS SELF ADMINISTERED DRUGS (ALT 637 FOR MEDICARE OP): Performed by: NEUROLOGICAL SURGERY

## 2023-10-06 PROCEDURE — 2500000001 HC RX 250 WO HCPCS SELF ADMINISTERED DRUGS (ALT 637 FOR MEDICARE OP): Performed by: INTERNAL MEDICINE

## 2023-10-06 PROCEDURE — 97535 SELF CARE MNGMENT TRAINING: CPT | Mod: GO

## 2023-10-06 PROCEDURE — 2500000004 HC RX 250 GENERAL PHARMACY W/ HCPCS (ALT 636 FOR OP/ED): Performed by: CLINICAL NURSE SPECIALIST

## 2023-10-06 PROCEDURE — 2500000004 HC RX 250 GENERAL PHARMACY W/ HCPCS (ALT 636 FOR OP/ED): Performed by: INTERNAL MEDICINE

## 2023-10-06 PROCEDURE — 2500000001 HC RX 250 WO HCPCS SELF ADMINISTERED DRUGS (ALT 637 FOR MEDICARE OP): Performed by: NURSE PRACTITIONER

## 2023-10-06 PROCEDURE — 2500000001 HC RX 250 WO HCPCS SELF ADMINISTERED DRUGS (ALT 637 FOR MEDICARE OP): Performed by: CLINICAL NURSE SPECIALIST

## 2023-10-06 RX ORDER — ONDANSETRON HYDROCHLORIDE 2 MG/ML
4 INJECTION, SOLUTION INTRAVENOUS EVERY 8 HOURS PRN
Status: DISCONTINUED | OUTPATIENT
Start: 2023-10-06 | End: 2023-10-07 | Stop reason: HOSPADM

## 2023-10-06 RX ORDER — MORPHINE SULFATE 2 MG/ML
2 INJECTION, SOLUTION INTRAMUSCULAR; INTRAVENOUS EVERY 2 HOUR PRN
Status: DISCONTINUED | OUTPATIENT
Start: 2023-10-06 | End: 2023-10-07 | Stop reason: HOSPADM

## 2023-10-06 RX ORDER — CYCLOBENZAPRINE HCL 10 MG
10 TABLET ORAL 3 TIMES DAILY PRN
Status: CANCELLED | OUTPATIENT
Start: 2023-10-06

## 2023-10-06 RX ORDER — OXYCODONE HYDROCHLORIDE 5 MG/1
10 TABLET ORAL EVERY 4 HOURS PRN
Status: DISCONTINUED | OUTPATIENT
Start: 2023-10-06 | End: 2023-10-07 | Stop reason: HOSPADM

## 2023-10-06 RX ORDER — NALOXONE HYDROCHLORIDE 1 MG/ML
0.2 INJECTION INTRAMUSCULAR; INTRAVENOUS; SUBCUTANEOUS EVERY 5 MIN PRN
Status: DISCONTINUED | OUTPATIENT
Start: 2023-10-06 | End: 2023-10-07 | Stop reason: HOSPADM

## 2023-10-06 RX ORDER — METHOCARBAMOL 500 MG/1
750 TABLET, FILM COATED ORAL EVERY 8 HOURS PRN
Status: DISCONTINUED | OUTPATIENT
Start: 2023-10-06 | End: 2023-10-07

## 2023-10-06 RX ORDER — SODIUM CHLORIDE, SODIUM LACTATE, POTASSIUM CHLORIDE, CALCIUM CHLORIDE 600; 310; 30; 20 MG/100ML; MG/100ML; MG/100ML; MG/100ML
100 INJECTION, SOLUTION INTRAVENOUS CONTINUOUS
Status: CANCELLED | OUTPATIENT
Start: 2023-10-06

## 2023-10-06 RX ORDER — DOCUSATE SODIUM 100 MG/1
100 CAPSULE, LIQUID FILLED ORAL 2 TIMES DAILY
Status: DISCONTINUED | OUTPATIENT
Start: 2023-10-06 | End: 2023-10-07 | Stop reason: HOSPADM

## 2023-10-06 RX ORDER — ONDANSETRON 4 MG/1
4 TABLET, ORALLY DISINTEGRATING ORAL EVERY 8 HOURS PRN
Status: DISCONTINUED | OUTPATIENT
Start: 2023-10-06 | End: 2023-10-07 | Stop reason: HOSPADM

## 2023-10-06 RX ORDER — ACETAMINOPHEN 325 MG/1
975 TABLET ORAL EVERY 8 HOURS
Status: DISCONTINUED | OUTPATIENT
Start: 2023-10-06 | End: 2023-10-07 | Stop reason: HOSPADM

## 2023-10-06 RX ORDER — OXYCODONE HYDROCHLORIDE 5 MG/1
5 TABLET ORAL EVERY 4 HOURS PRN
Status: DISCONTINUED | OUTPATIENT
Start: 2023-10-06 | End: 2023-10-07 | Stop reason: HOSPADM

## 2023-10-06 RX ORDER — CEFAZOLIN SODIUM 2 G/100ML
2 INJECTION, SOLUTION INTRAVENOUS EVERY 8 HOURS
Status: DISPENSED | OUTPATIENT
Start: 2023-10-06 | End: 2023-10-07

## 2023-10-06 RX ADMIN — PREGABALIN 50 MG: 50 CAPSULE ORAL at 08:43

## 2023-10-06 RX ADMIN — LEVOTHYROXINE SODIUM 150 MCG: 150 TABLET ORAL at 06:31

## 2023-10-06 RX ADMIN — HYDRALAZINE HYDROCHLORIDE 10 MG: 10 TABLET, FILM COATED ORAL at 21:50

## 2023-10-06 RX ADMIN — CYCLOBENZAPRINE 10 MG: 10 TABLET, FILM COATED ORAL at 02:08

## 2023-10-06 RX ADMIN — POTASSIUM CHLORIDE 20 MEQ: 1500 TABLET, EXTENDED RELEASE ORAL at 08:42

## 2023-10-06 RX ADMIN — HYDROCORTISONE 10 MG: 10 TABLET ORAL at 08:44

## 2023-10-06 RX ADMIN — DULOXETINE HYDROCHLORIDE 60 MG: 30 CAPSULE, DELAYED RELEASE ORAL at 08:42

## 2023-10-06 RX ADMIN — MORPHINE SULFATE 30 MG: 30 TABLET, EXTENDED RELEASE ORAL at 21:51

## 2023-10-06 RX ADMIN — ALLOPURINOL 300 MG: 300 TABLET ORAL at 08:44

## 2023-10-06 RX ADMIN — ACETAMINOPHEN 975 MG: 325 TABLET ORAL at 17:13

## 2023-10-06 RX ADMIN — DOCUSATE SODIUM 100 MG: 100 CAPSULE, LIQUID FILLED ORAL at 21:51

## 2023-10-06 RX ADMIN — METHOCARBAMOL 750 MG: 500 TABLET ORAL at 18:28

## 2023-10-06 RX ADMIN — PREGABALIN 50 MG: 50 CAPSULE ORAL at 15:10

## 2023-10-06 RX ADMIN — MORPHINE SULFATE 30 MG: 30 TABLET, EXTENDED RELEASE ORAL at 08:43

## 2023-10-06 RX ADMIN — CEFAZOLIN SODIUM 2 G: 2 INJECTION, SOLUTION INTRAVENOUS at 18:29

## 2023-10-06 RX ADMIN — FUROSEMIDE 20 MG: 20 TABLET ORAL at 08:44

## 2023-10-06 RX ADMIN — TAMSULOSIN HYDROCHLORIDE 0.4 MG: 0.4 CAPSULE ORAL at 08:44

## 2023-10-06 RX ADMIN — OXYCODONE HYDROCHLORIDE 10 MG: 5 TABLET ORAL at 02:08

## 2023-10-06 RX ADMIN — TAMSULOSIN HYDROCHLORIDE 0.4 MG: 0.4 CAPSULE ORAL at 21:51

## 2023-10-06 RX ADMIN — HYDRALAZINE HYDROCHLORIDE 10 MG: 10 TABLET, FILM COATED ORAL at 08:43

## 2023-10-06 RX ADMIN — HYDRALAZINE HYDROCHLORIDE 10 MG: 10 TABLET, FILM COATED ORAL at 15:10

## 2023-10-06 RX ADMIN — Medication 5 G: at 09:00

## 2023-10-06 RX ADMIN — PANTOPRAZOLE SODIUM 40 MG: 40 TABLET, DELAYED RELEASE ORAL at 06:31

## 2023-10-06 RX ADMIN — DOCUSATE SODIUM 100 MG: 100 CAPSULE, LIQUID FILLED ORAL at 08:43

## 2023-10-06 RX ADMIN — CYCLOBENZAPRINE 10 MG: 10 TABLET, FILM COATED ORAL at 08:50

## 2023-10-06 RX ADMIN — PREGABALIN 50 MG: 50 CAPSULE ORAL at 21:50

## 2023-10-06 ASSESSMENT — COGNITIVE AND FUNCTIONAL STATUS - GENERAL
HELP NEEDED FOR BATHING: A LOT
STANDING UP FROM CHAIR USING ARMS: A LITTLE
STANDING UP FROM CHAIR USING ARMS: A LITTLE
TURNING FROM BACK TO SIDE WHILE IN FLAT BAD: A LITTLE
MOBILITY SCORE: 16
TOILETING: A LOT
EATING MEALS: A LITTLE
DAILY ACTIVITIY SCORE: 16
WALKING IN HOSPITAL ROOM: A LOT
MOVING TO AND FROM BED TO CHAIR: A LITTLE
CLIMB 3 TO 5 STEPS WITH RAILING: A LOT
DRESSING REGULAR LOWER BODY CLOTHING: A LOT
CLIMB 3 TO 5 STEPS WITH RAILING: A LOT
DRESSING REGULAR UPPER BODY CLOTHING: A LITTLE
MOVING TO AND FROM BED TO CHAIR: A LITTLE
WALKING IN HOSPITAL ROOM: A LOT
MOBILITY SCORE: 18
MOVING FROM LYING ON BACK TO SITTING ON SIDE OF FLAT BED WITH BEDRAILS: A LITTLE

## 2023-10-06 ASSESSMENT — PAIN SCALES - GENERAL
PAINLEVEL_OUTOF10: 8
PAINLEVEL_OUTOF10: 9
PAINLEVEL_OUTOF10: 8
PAINLEVEL_OUTOF10: 4

## 2023-10-06 ASSESSMENT — ACTIVITIES OF DAILY LIVING (ADL): HOME_MANAGEMENT_TIME_ENTRY: 15

## 2023-10-06 NOTE — CARE PLAN
The patient's goals for the shift include      The clinical goals for the shift include pain control/ increased ambulation        Problem: Pain  Goal: Takes deep breaths with improved pain control throughout the shift  10/6/2023 0826 by Bobbi Larson RN  Outcome: Progressing  10/6/2023 0802 by Bobbi Larson RN  Outcome: Progressing  Goal: Turns in bed with improved pain control throughout the shift  10/6/2023 0826 by Bobbi Larson RN  Outcome: Progressing  10/6/2023 0802 by Bobbi Larson RN  Outcome: Progressing  Goal: Walks with improved pain control throughout the shift  10/6/2023 0826 by Bobbi Larson RN  Outcome: Progressing  10/6/2023 0802 by Bobbi Larson RN  Outcome: Progressing  Goal: Performs ADL's with improved pain control throughout shift  10/6/2023 0826 by Bobbi Larson RN  Outcome: Progressing  10/6/2023 0802 by Bobbi Larson RN  Outcome: Progressing  Goal: Participates in PT with improved pain control throughout the shift  10/6/2023 0826 by Bobbi Larson RN  Outcome: Progressing  10/6/2023 0802 by Bobbi Larson RN  Outcome: Progressing  Goal: Free from opioid side effects throughout the shift  10/6/2023 0826 by Bobbi Larson RN  Outcome: Progressing  10/6/2023 0802 by Bobbi Larson RN  Outcome: Progressing  Goal: Free from acute confusion related to pain meds throughout the shift  10/6/2023 0826 by Bobbi Larson RN  Outcome: Progressing  10/6/2023 0802 by Bobbi Larson RN  Outcome: Progressing

## 2023-10-06 NOTE — PROGRESS NOTES
Danny Bah is a 82 y.o. male on day 0 of admission presenting with Pseudoclaudication syndrome.    Subjective   The patient continues to have some difficulty with his pain control.  That he has been on high-dose long-acting narcotics for greater than 10 months.  I believe he has been accepted to a rehab facility or skilled nursing facility.  There is a paper chart H&P that can be scanned to the facility.       Objective     Physical Exam: The patient continues to have the weakness in the left quadriceps.  Sensation is intact in the lower extremities this morning so the tingling has improved.    Last Recorded Vitals  Blood pressure (!) 151/93, pulse 85, temperature 36.7 °C (98.1 °F), resp. rate 16, height 1.829 m (6'), weight 97.6 kg (215 lb 2.7 oz), SpO2 91 %.  Intake/Output last 3 Shifts:  I/O last 3 completed shifts:  In: 360 (3.7 mL/kg) [P.O.:360]  Out: 3050 (31.3 mL/kg) [Urine:3050 (0.9 mL/kg/hr)]  Weight: 97.6 kg     Relevant Results                             Assessment/Plan   Principal Problem:    Pseudoclaudication syndrome  Active Problems:    Moderate recurrent major depression (CMS/HCC)    Other specified hypothyroidism    Glucocorticoid deficiency (CMS/HCC)    Generalized anxiety disorder    Chronic pain    Elevated prostate specific antigen (PSA)    Other emphysema (CMS/HCC)  Steady progress post op.  To SNF today.  Follow up with our offiice in two weeks and with his pain management doctor in two weeks as well.             Geronimo Vasquez MD       Date: 3/4/2020   Patient Name: Selvin Qureshi    Organ: Kidney    Current Status: INACTIVE     Status change: Removal    Current Weight:   Wt Readings from Last 1 Encounters:   02/10/20 101.6 kg         Reason for reactivation:     Reason for inactivation:   [] Temporarily too sick-   [] Temporarily too well  [] Work up incomplete  [] Financial/insurance complications  [] Patient choice   [] Unable to contact patient  [] Weight currently inappropriate for transplant  [] Candidate for living donor transplant only   [] Inappropriate substance use  [] Medical non-compliance  [] Inactivation due to VAD implantation and/or VAD complication  [] Transplant Pending         Reason for removal:   [] Refused transplant:   [] Transferred to another center  []  Date: Cause:   [x] Candidate condition deteriorated, too sick for txp: Needs a thyroidectomy and wait 3 years before re-referring   [] Candidate condition improved, txp not needed  [] Transplanted at another center   [] Unable to contact candidate  [] Candidate listed in error  [] Candidate listed for unacceptable antigens only   [] Patient  during transplant procedure  [] Weight issues (Other)  [] Financial/Insurance Reasons (Other)  [] Does not meet criteria (Other)    Changes from inactive to active have been reviewed by the financial counselor

## 2023-10-06 NOTE — CARE PLAN
The patient's goals for the shift include      The clinical goals for the shift include ambulate with improved pain control      Problem: Pain  Goal: Takes deep breaths with improved pain control throughout the shift  Outcome: Progressing  Goal: Turns in bed with improved pain control throughout the shift  Outcome: Progressing  Goal: Walks with improved pain control throughout the shift  Outcome: Progressing  Goal: Performs ADL's with improved pain control throughout shift  Outcome: Progressing  Goal: Participates in PT with improved pain control throughout the shift  Outcome: Progressing  Goal: Free from opioid side effects throughout the shift  Outcome: Progressing  Goal: Free from acute confusion related to pain meds throughout the shift  Outcome: Progressing

## 2023-10-06 NOTE — DISCHARGE SUMMARY
Discharge Diagnosis  Pseudoclaudication syndrome    Issues Requiring Follow-Up  Continued wound assessment and coordination of therapies.    Test Results Pending At Discharge  Pending Labs       No current pending labs.            Hospital Course   The patient was admitted on October 2 and underwent decompressive laminectomy and disc excision.  He has had some improvement but high amount of pain and the weakness in his legs persisted following the surgery.  His wound is clean but he is going to require extensive rehabilitation.    Pertinent Physical Exam At Time of Discharge  Physical Exam: He is able to ambulate with his walker with assistance.  But he does have persistent leg weakness.    Home Medications     Medication List      START taking these medications     morphine CR 30 mg 12 hr tablet; Commonly known as: MS Contin; Take 1   tablet (30 mg) by mouth every 12 hours for 10 days. Do not crush, chew, or   split.   naloxone 1 mg/mL injection; Commonly known as: Narcan; Infuse 0.2 mL   (0.2 mg) into a venous catheter every 5 minutes if needed for respiratory   depression. May repeat every 2-3 minutes as needed until medical   assistance available.   * oxyCODONE 5 mg immediate release tablet; Commonly known as:   Roxicodone; Take 1 tablet (5 mg) by mouth every 4 hours if needed for   moderate pain (4 - 6) for up to 7 days.   * oxyCODONE 10 mg immediate release tablet; Commonly known as:   Roxicodone; Take 1 tablet (10 mg) by mouth every 6 hours if needed for   severe pain (7 - 10) for up to 7 days.  * This list has 2 medication(s) that are the same as other medications   prescribed for you. Read the directions carefully, and ask your doctor or   other care provider to review them with you.     CONTINUE taking these medications     ALBUTEROL INHL   allopurinol 300 mg tablet; Commonly known as: Zyloprim   calcium carbonate-vitamin D3 500 mg-5 mcg (200 unit) tablet   cyclobenzaprine 10 mg tablet; Commonly known as:  Flexeril   DULoxetine 60 mg DR capsule; Commonly known as: Cymbalta   FLOLIPID ORAL   furosemide 40 mg tablet; Commonly known as: Lasix   guanFACINE 2 mg tablet; Commonly known as: Tenex   hydrALAZINE 10 mg tablet; Commonly known as: Apresoline   hydrocortisone 10 mg tablet; Commonly known as: Cortef   iron polysaccharides 150 mg iron capsule; Commonly known as:   Nu-Iron,Niferex   levothyroxine 150 mcg tablet; Commonly known as: Synthroid, Levoxyl   magnesium 30 mg tablet   omeprazole 20 mg DR capsule; Commonly known as: PriLOSEC   potassium chloride ER 10 mEq ER capsule; Commonly known as: Micro-K   pregabalin 50 mg capsule; Commonly known as: Lyrica   psyllium 3.4 gram packet; Commonly known as: Metamucil   tamsulosin 0.4 mg 24 hr capsule; Commonly known as: Flomax   testosterone cypionate 200 mg/mL injection; Commonly known as:   Depo-Testosterone       Outpatient Follow-Up  Future Appointments   Date Time Provider Department Center   10/19/2023 11:00 AM MARIO Sheth-CNP [id number] [place]   11/17/2023 10:30 AM Geronimo Vasquez MD [id number] [place]   1/4/2024 11:00 AM Geronimo Vasquez MD [id number] Trae Vaqsuez MD

## 2023-10-06 NOTE — PROGRESS NOTES
Occupational Therapy    Occupational Therapy Treatment    Name: Danny Bah  MRN: [id number]  : 1940  Date: 10/06/23  Time Calculation  Start Time: 1053  Stop Time: 1116  Time Calculation (min): 23 min      Subjective   General:     General  Reason for Referral: OT eval & treat s/p surgery (s/p L3-4, L4-5, L5-S1 decompressive laminectomy with B L5-S1 foraminotomies & L4-5 disc excision)  Referred By: Geronimo Vasquez  Past Medical History Relevant to Rehab: 10/2/23 back surgery; chronic pain, moderate recurrent major depression, MIKHAIL, hypothyroidism  Missed Visit: Yes  Missed Visit Reason:  (Per conference with RN, patient is going for stat CT.)  Co-Treatment: PT  Prior to Session Communication: Bedside nurse  Patient Position Received: Bed, 2 rail up  General Comment:  (PT AGREEABLE TO SIT UP IN CHAIR DURING TX SESSION)  Vitals:     Pain Assessment:  Pain Assessment  Pain Assessment:  (8/10 full back pain at rest and with mobility, RN aware)  Response to Interventions: PT PROVIDED WITH ICE PACK AT END OF TREATMENT SESSION.     Objective     LE Dressing  LE Dressing:  (PT REQUIRED MOD ASSIST FOR DONNING BRIEF WITH USE OF REACHER AND MAX ASSIST TO PULL UP OVER HIPS WHEN IN STANDING UP TO WALKER, PT REQUIRED MAX ASSIST TO DON BACK BRACE.  PT REQUIRED MOD ASSIT TO DON SOCKS WITH USE OF SOCK AID AFTER INSTRUCTION .)       Functional Standing Tolerance:  Functional Standing Tolerance  Time:  (pt. tolerated 2.0 min. of static stand balance with mod a using walker for support.)  Functional Standing Tolerance Comments: PT ABLE TO TOLERATE STATIC STAND UP TO WALKER X 1 MINUTE  Bed Mobility/Transfers: Bed Mobility  Bed Mobility:  (supine to sit: min A to raise trunk to upright position with cueing for maintaining spinal precautions)    Transfers  Transfer:  (STS: min A x 1 with cueing for upright posture due to pt demonstrating forward flexion)    Therapy/Activity: Therapeutic Activity  Therapeutic Activity Performed:  Yes  Therapeutic Activity 1:  (PT ABLE TO MOBILIZE FROM BED TO CHAIR WIT WW WITH IN ASSIT X 3 FEET WITH MIN VERBAL CUES.)    Strength:  Strength  Strength Comments:  (hip flexion rle 3+/5 lle 2+/5, knee ext rle 4/5 lle 2+/5; lle pain limited)  Other Activity:       Outcome Measures:  [facility] Daily Activity  Putting on and taking off regular lower body clothing: A lot  Bathing (including washing, rinsing, drying): A lot  Putting on and taking off regular upper body clothing: A little  Toileting, which includes using toilet, bedpan or urinal: A lot  Taking care of personal grooming such as brushing teeth: None  Eating Meals: A little  Daily Activity - Total Score: 16        Education Documentation  Precautions, taught by Wendy Stroud OT at 10/6/2023  1:17 PM.  Learner: Patient  Readiness: Acceptance  Method: Explanation, Demonstration  Response: Verbalizes Understanding, Needs Reinforcement    ADL Training, taught by Wendy Stroud OT at 10/6/2023  1:17 PM.  Learner: Patient  Readiness: Acceptance  Method: Explanation, Demonstration  Response: Verbalizes Understanding, Needs Reinforcement    Mobility Training, taught by Wendy Stroud OT at 10/6/2023  1:17 PM.  Learner: Patient  Readiness: Acceptance  Method: Explanation, Demonstration  Response: Verbalizes Understanding, Needs Reinforcement    Education Comments  No comments found.      Goals:  Encounter Problems       Encounter Problems (Active)       ADLs       LE bathing (Progressing)       Start:  10/03/23    Expected End:  10/17/23       SBA with adaptive equipment PRN         LE dressing (Progressing)       Start:  10/03/23    Expected End:  10/17/23       SBA with adaptive equipment PRN           Toileting (Progressing)       Start:  10/03/23    Expected End:  10/17/23       SBA with adaptive equipment PRN            MOBILITY       Functional mobility (Progressing)       Start:  10/03/23    Expected End:  10/17/23       SBA with DME for safety and  compliance to post op precautions and walker safety techs.            TRANSFERS       To/from bed, chair, commode (Progressing)       Start:  10/03/23    Expected End:  10/17/23       With SBA using DME for safety prn

## 2023-10-06 NOTE — CARE PLAN
REMOTE COVERAGE- Called into pt room, role of TCC explained. Pt confirms discharge plan to be to [facility]. Awaiting precert. Reached out to Andie Chase to notify of discharge. IMM reviewed, charted, emailed copy to Nurse Bobbi Larson to  provide pt with physical copy. PT would like wife notified of discharge  after everything is done and set up. CT will follow.

## 2023-10-06 NOTE — PROGRESS NOTES
Physical Therapy    Physical Therapy Treatment    Patient Name: Danny Bah  MRN: [id number]  Today's Date: 10/6/2023  Time Calculation  Start Time: 1052  Stop Time: 1115  Time Calculation (min): 23 min       Assessment/Plan   PT Assessment  PT Assessment Results: Decreased strength, Decreased endurance, Impaired balance, Decreased mobility, Pain  Rehab Prognosis: Good  Evaluation/Treatment Tolerance: Patient limited by pain  End of Session Communication: Bedside nurse (RN notified on increased pain)  End of Session Patient Position:  Seated in chair with ice pack applied, all needs in reach, and no complaints (other than pain) noted.     PT Plan  Treatment/Interventions: Bed mobility, Transfer training, Gait training, Stair training, Balance training, Strengthening, Endurance training, Therapeutic exercise, Positioning  PT Plan: Skilled PT  PT Frequency: 5 times per week  PT Discharge Recommendations: High intensity level of continued care      General Visit Information:   PT  Visit  PT Received On: 10/06/23  General  Co-Treatment: OT  Prior to Session Communication: Bedside nurse  Patient Position Received:  (Supine in bed and agreeable to PT.)    Subjective   Precautions:  Precautions  Post-Surgical Precautions:  (spinal precautions, LSO when out of bed)  Precautions Comment: spinal, 3 L O2, fall/safety, LSO with activity    Objective   Pain:  Pain Assessment  Pain Assessment:  (8/10 full back pain at rest and with mobility, RN aware)  Cognition:  Cognition  Overall Cognitive Status: Within Functional Limits    Treatments:  Therapeutic Exercise  Therapeutic Exercise Performed:  (Pt was able to demonstrate B LE ther ex while sitting up in chair. Pt completed marches, LAQs, hip abd, and ankle pumps)    Balance/Neuromuscular Re-Education  Balance/Neuromuscular Re-Education Activity Performed:  (Pt was able to maintain Fair- to Fair seated balance x 6 min while completing a variety of dynamic tasks. Pt was then able  to stand x 1 min with Fair- balance noted during gait training.)    Bed Mobility  Bed Mobility:  (supine to sit: min A to raise trunk to upright position with cueing for maintaining spinal precautions)    Ambulation/Gait Training  Ambulation/Gait Training Performed:  (Pt was only able to amb 3' from EOB to chair using RW with CGA due to increased back pain.)  Transfers  Transfer:  (STS: min A x 1 with cueing for upright posture due to pt demonstrating forward flexion)    Outcome Measures:  [facility] Basic Mobility  Turning from your back to your side while in a flat bed without using bedrails: A little  Moving from lying on your back to sitting on the side of a flat bed without using bedrails: A little  Moving to and from bed to chair (including a wheelchair): A little  Standing up from a chair using your arms (e.g. wheelchair or bedside chair): A little  To walk in hospital room: A lot  Climbing 3-5 steps with railing: A lot  Basic Mobility - Total Score: 16    Education Documentation  Mobility Training, taught by Jolie Turner PT at 10/6/2023 12:49 PM.  Learner: Patient  Readiness: Acceptance  Method: Explanation  Response: Verbalizes Understanding    Education Comments  No comments found.      Encounter Problems       Encounter Problems (Active)       PT Problem       bed mobility  (Progressing)       Start:  10/03/23    Expected End:  10/17/23       Modified ind supine<>sit using log roll technique         transfers  (Progressing)       Start:  10/03/23    Expected End:  10/17/23       Modified ind sit<>stand w/ ww          gait (Progressing)       Start:  10/03/23    Expected End:  10/17/23       Modified ind w/ ww >=50ftx2         stairs  (Progressing)       Start:  10/03/23    Expected End:  10/17/23       Cga negotiating >=2 steps simulating home environment

## 2023-10-07 VITALS
RESPIRATION RATE: 20 BRPM | DIASTOLIC BLOOD PRESSURE: 63 MMHG | TEMPERATURE: 98.8 F | HEIGHT: 72 IN | OXYGEN SATURATION: 93 % | WEIGHT: 215.17 LBS | SYSTOLIC BLOOD PRESSURE: 118 MMHG | HEART RATE: 74 BPM | BODY MASS INDEX: 29.14 KG/M2

## 2023-10-07 LAB
ANION GAP SERPL CALC-SCNC: 14 MMOL/L (ref 10–20)
BUN SERPL-MCNC: 18 MG/DL (ref 6–23)
CALCIUM SERPL-MCNC: 8.7 MG/DL (ref 8.6–10.3)
CHLORIDE SERPL-SCNC: 96 MMOL/L (ref 98–107)
CO2 SERPL-SCNC: 27 MMOL/L (ref 21–32)
CREAT SERPL-MCNC: 0.87 MG/DL (ref 0.5–1.3)
ERYTHROCYTE [DISTWIDTH] IN BLOOD BY AUTOMATED COUNT: 15.6 % (ref 11.5–14.5)
GFR SERPL CREATININE-BSD FRML MDRD: 87 ML/MIN/1.73M*2
GLUCOSE SERPL-MCNC: 98 MG/DL (ref 74–99)
HCT VFR BLD AUTO: 38.4 % (ref 41–52)
HGB BLD-MCNC: 12.5 G/DL (ref 13.5–17.5)
MCH RBC QN AUTO: 29 PG (ref 26–34)
MCHC RBC AUTO-ENTMCNC: 32.6 G/DL (ref 32–36)
MCV RBC AUTO: 89 FL (ref 80–100)
NRBC BLD-RTO: 0 /100 WBCS (ref 0–0)
PLATELET # BLD AUTO: 241 X10*3/UL (ref 150–450)
PMV BLD AUTO: 10.3 FL (ref 7.5–11.5)
POTASSIUM SERPL-SCNC: 4 MMOL/L (ref 3.5–5.3)
RBC # BLD AUTO: 4.31 X10*6/UL (ref 4.5–5.9)
SODIUM SERPL-SCNC: 133 MMOL/L (ref 136–145)
WBC # BLD AUTO: 8.1 X10*3/UL (ref 4.4–11.3)

## 2023-10-07 PROCEDURE — 2500000001 HC RX 250 WO HCPCS SELF ADMINISTERED DRUGS (ALT 637 FOR MEDICARE OP): Performed by: STUDENT IN AN ORGANIZED HEALTH CARE EDUCATION/TRAINING PROGRAM

## 2023-10-07 PROCEDURE — 2500000001 HC RX 250 WO HCPCS SELF ADMINISTERED DRUGS (ALT 637 FOR MEDICARE OP): Performed by: NEUROLOGICAL SURGERY

## 2023-10-07 PROCEDURE — 85027 COMPLETE CBC AUTOMATED: CPT | Performed by: NEUROLOGICAL SURGERY

## 2023-10-07 PROCEDURE — 2500000004 HC RX 250 GENERAL PHARMACY W/ HCPCS (ALT 636 FOR OP/ED): Performed by: NEUROLOGICAL SURGERY

## 2023-10-07 PROCEDURE — 7100000011 HC EXTENDED STAY RECOVERY HOURLY - NURSING UNIT

## 2023-10-07 PROCEDURE — 36415 COLL VENOUS BLD VENIPUNCTURE: CPT | Performed by: NEUROLOGICAL SURGERY

## 2023-10-07 PROCEDURE — 80048 BASIC METABOLIC PNL TOTAL CA: CPT | Performed by: NEUROLOGICAL SURGERY

## 2023-10-07 RX ORDER — CYCLOBENZAPRINE HCL 10 MG
10 TABLET ORAL 3 TIMES DAILY
Status: DISCONTINUED | OUTPATIENT
Start: 2023-10-07 | End: 2023-10-07 | Stop reason: HOSPADM

## 2023-10-07 RX ADMIN — HYDROCORTISONE 10 MG: 10 TABLET ORAL at 08:38

## 2023-10-07 RX ADMIN — ACETAMINOPHEN 975 MG: 325 TABLET ORAL at 08:37

## 2023-10-07 RX ADMIN — DULOXETINE HYDROCHLORIDE 60 MG: 30 CAPSULE, DELAYED RELEASE ORAL at 08:37

## 2023-10-07 RX ADMIN — LEVOTHYROXINE SODIUM 150 MCG: 150 TABLET ORAL at 06:35

## 2023-10-07 RX ADMIN — PREGABALIN 50 MG: 50 CAPSULE ORAL at 08:38

## 2023-10-07 RX ADMIN — OXYCODONE HYDROCHLORIDE 10 MG: 5 TABLET ORAL at 00:42

## 2023-10-07 RX ADMIN — ACETAMINOPHEN 975 MG: 325 TABLET ORAL at 00:42

## 2023-10-07 RX ADMIN — DOCUSATE SODIUM 100 MG: 100 CAPSULE, LIQUID FILLED ORAL at 08:38

## 2023-10-07 RX ADMIN — OXYCODONE HYDROCHLORIDE 10 MG: 5 TABLET ORAL at 07:08

## 2023-10-07 RX ADMIN — HYDRALAZINE HYDROCHLORIDE 10 MG: 10 TABLET, FILM COATED ORAL at 08:37

## 2023-10-07 RX ADMIN — POTASSIUM CHLORIDE 20 MEQ: 1500 TABLET, EXTENDED RELEASE ORAL at 08:38

## 2023-10-07 RX ADMIN — PANTOPRAZOLE SODIUM 40 MG: 40 TABLET, DELAYED RELEASE ORAL at 06:36

## 2023-10-07 RX ADMIN — MORPHINE SULFATE 30 MG: 30 TABLET, EXTENDED RELEASE ORAL at 08:38

## 2023-10-07 RX ADMIN — METHOCARBAMOL 750 MG: 500 TABLET ORAL at 06:35

## 2023-10-07 RX ADMIN — ALLOPURINOL 300 MG: 300 TABLET ORAL at 08:37

## 2023-10-07 RX ADMIN — CYCLOBENZAPRINE 10 MG: 10 TABLET, FILM COATED ORAL at 11:22

## 2023-10-07 RX ADMIN — TAMSULOSIN HYDROCHLORIDE 0.4 MG: 0.4 CAPSULE ORAL at 08:38

## 2023-10-07 RX ADMIN — Medication 5 G: at 09:00

## 2023-10-07 RX ADMIN — FUROSEMIDE 20 MG: 20 TABLET ORAL at 08:38

## 2023-10-07 ASSESSMENT — PAIN SCALES - GENERAL
PAINLEVEL_OUTOF10: 9
PAINLEVEL_OUTOF10: 9

## 2023-10-07 NOTE — DISCHARGE SUMMARY
Discharge Diagnosis  Neurogenic claudication    Issues Requiring Follow-Up  2 week follow up with neurosurgery for wound care    Test Results Pending At Discharge  Pending Labs       No current pending labs.            Hospital Course   S/p lumbar laminectomy, doing well, needs follow up at discharge for continued wound care and SNF for rehab    Pertinent Physical Exam At Time of Discharge  General: Well developed, awake/alert/oriented x3, no distress, alert and cooperative  Skin: Warm and dry, no lesions, no rashes  ENMT: Mucous membranes moist, no apparent injury, no lesions seen  Head/Neck: Neck Supple, no apparent injury  Respiratory/Thorax: Normal breath sounds with good chest expansion, thorax symmetric  Cardiovascular: No pitting edema, no JVD    Motor Strength: 5/5 Throughout all extremities    Muscle Bulk: Normal and symmetric in all extremities    Posture:   -- Cervical: Normal  -- Thoracic: Normal  -- Lumbar : Normal  Paraspinal muscle spasm/tenderness absent.     Sensation: intact to light touch    Home Medications     Medication List      START taking these medications     morphine CR 30 mg 12 hr tablet; Commonly known as: MS Contin; Take 1   tablet (30 mg) by mouth every 12 hours for 10 days. Do not crush, chew, or   split.   naloxone 1 mg/mL injection; Commonly known as: Narcan; Infuse 0.2 mL   (0.2 mg) into a venous catheter every 5 minutes if needed for respiratory   depression. May repeat every 2-3 minutes as needed until medical   assistance available.   * oxyCODONE 5 mg immediate release tablet; Commonly known as:   Roxicodone; Take 1 tablet (5 mg) by mouth every 4 hours if needed for   moderate pain (4 - 6) for up to 7 days.   * oxyCODONE 10 mg immediate release tablet; Commonly known as:   Roxicodone; Take 1 tablet (10 mg) by mouth every 6 hours if needed for   severe pain (7 - 10) for up to 7 days.  * This list has 2 medication(s) that are the same as other medications   prescribed for you.  Read the directions carefully, and ask your doctor or   other care provider to review them with you.     CONTINUE taking these medications     ALBUTEROL INHL   allopurinol 300 mg tablet; Commonly known as: Zyloprim   calcium carbonate-vitamin D3 500 mg-5 mcg (200 unit) tablet   cyclobenzaprine 10 mg tablet; Commonly known as: Flexeril   DULoxetine 60 mg DR capsule; Commonly known as: Cymbalta   FLOLIPID ORAL   furosemide 40 mg tablet; Commonly known as: Lasix   guanFACINE 2 mg tablet; Commonly known as: Tenex   hydrALAZINE 10 mg tablet; Commonly known as: Apresoline   hydrocortisone 10 mg tablet; Commonly known as: Cortef   iron polysaccharides 150 mg iron capsule; Commonly known as:   Nu-Iron,Niferex   levothyroxine 150 mcg tablet; Commonly known as: Synthroid, Levoxyl   magnesium 30 mg tablet   omeprazole 20 mg DR capsule; Commonly known as: PriLOSEC   potassium chloride ER 10 mEq ER capsule; Commonly known as: Micro-K   pregabalin 50 mg capsule; Commonly known as: Lyrica   psyllium 3.4 gram packet; Commonly known as: Metamucil   tamsulosin 0.4 mg 24 hr capsule; Commonly known as: Flomax   testosterone cypionate 200 mg/mL injection; Commonly known as:   Depo-Testosterone       Outpatient Follow-Up  Future Appointments   Date Time Provider Department [place]   10/19/2023 11:00 AM MARIO Sheth-CNP [id number] [place]   11/17/2023 10:30 AM Geronimo Vasquez MD [id number] [place]   1/4/2024 11:00 AM Geronimo Vasquez MD [id number] [place]             Royer Melissa M.D.  Director of Spine [place]  [facility]   of Neurological Surgery  [facility] [employer]  Office: [phone]  Fax: [phone]

## 2023-10-07 NOTE — CARE PLAN
The patient's goals for the shift include      The clinical goals for the shift include pain control and increased mobility

## 2023-10-07 NOTE — H&P
Neurological Surgery    History and Physical      Referral Diagnosis:   1. Degeneration of lumbar or lumbosacral intervertebral disc  morphine CR (MS Contin) 30 mg 12 hr tablet    naloxone (Narcan) 1 mg/mL injection    oxyCODONE (Roxicodone) 5 mg immediate release tablet    oxyCODONE (Roxicodone) 10 mg immediate release tablet          History Of Present Illness  Danny Bah is a 82 y.o. male presenting with presenting for lumbar surgery for neurogenic claudication.    Past Medical History  History reviewed. No pertinent past medical history.    Surgical History  History reviewed. No pertinent surgical history.    Social History  Social History     Tobacco Use    Smoking status: Never    Smokeless tobacco: Never   Substance Use Topics    Alcohol use: Not Currently    Drug use: Never       Allergies  Penicillins  Medications Prior to Admission   Medication Sig Dispense Refill Last Dose    allopurinol (Zyloprim) 300 mg tablet Take 1 tablet (300 mg) by mouth once daily.   10/2/2023    calcium carbonate-vitamin D3 500 mg-5 mcg (200 unit) tablet Take 1 tablet by mouth once daily.   Past Week    cyclobenzaprine (Flexeril) 10 mg tablet Take 1 tablet (10 mg) by mouth 3 times a day as needed for muscle spasms.   10/1/2023    DULoxetine (Cymbalta) 60 mg DR capsule Take 1 capsule (60 mg) by mouth once daily. Do not crush or chew.   10/2/2023    furosemide (Lasix) 40 mg tablet Take by mouth.   10/1/2023    hydrocortisone (Cortef) 10 mg tablet Take by mouth once daily.   10/2/2023    iron polysaccharides (Nu-Iron,Niferex) 150 mg iron capsule Take 1 capsule (150 mg) by mouth once daily.   Past Week    levothyroxine (Synthroid, Levoxyl) 150 mcg tablet Take 1 tablet (150 mcg) by mouth once daily in the morning. Take before meals.   10/2/2023    magnesium 30 mg tablet Take 1 tablet (30 mg) by mouth 2 times a day.   Past Week    omeprazole (PriLOSEC) 20 mg DR capsule Take 1 capsule (20 mg) by mouth. Do not crush or chew.    10/2/2023    potassium chloride ER (Micro-K) 10 mEq ER capsule Take 1 capsule (10 mEq) by mouth 2 times a day. Do not crush or chew.   Past Week    pregabalin (Lyrica) 50 mg capsule Take 1 capsule (50 mg) by mouth 3 times a day.   10/2/2023    psyllium (Metamucil) 3.4 gram packet Take 1 packet by mouth once daily.   10/1/2023    simvastatin (FLOLIPID ORAL) Take 0.4 mg by mouth once daily.   10/1/2023    tamsulosin (Flomax) 0.4 mg 24 hr capsule Take 1 capsule (0.4 mg) by mouth 2 times a day.       testosterone cypionate (Depo-Testosterone) 200 mg/mL injection Inject 0.5 mL IM once weekly       ALBUTEROL INHL Inhale 90 mg 4 times a day as needed.   Unknown    guanFACINE (Tenex) 2 mg tablet Take by mouth.       hydrALAZINE (Apresoline) 10 mg tablet Take 1 tablet (10 mg) by mouth 3 times a day.   Unknown       Review of Systems  14/14 systems reviewed and negative other than what is listed in the history of present illness    Physical Exam    General: Well developed, awake/alert/oriented x3, no distress, alert and cooperative  Skin: Warm and dry, no lesions, no rashes  ENMT: Mucous membranes moist, no apparent injury, no lesions seen  Head/Neck: Neck Supple, no apparent injury  Respiratory/Thorax: Normal breath sounds with good chest expansion, thorax symmetric  Cardiovascular: No pitting edema, no JVD    Motor Strength: 5/5 Throughout all extremities    Muscle Bulk: Normal and symmetric in all extremities    Posture:   -- Cervical: Normal  -- Thoracic: Normal  -- Lumbar : Normal  Paraspinal muscle spasm/tenderness absent.     Sensation: intact to light touch    Last Recorded Vitals  Blood pressure 118/63, pulse 74, temperature 37.1 °C (98.8 °F), temperature source Temporal, resp. rate 20, height 1.829 m (6'), weight 97.6 kg (215 lb 2.7 oz), SpO2 93 %.    Relevant Results  Scheduled medications  acetaminophen, 975 mg, oral, q8h  allopurinol, 300 mg, oral, Daily  cyclobenzaprine, 10 mg, oral, TID  docusate sodium, 100  mg, oral, BID  DULoxetine, 60 mg, oral, Daily  furosemide, 20 mg, oral, Daily  hydrALAZINE, 10 mg, oral, TID  hydrocortisone, 10 mg, oral, Daily  levothyroxine, 150 mcg, oral, Daily before breakfast  morphine CR, 30 mg, oral, q12h KJ  pantoprazole, 40 mg, oral, Daily before breakfast  potassium chloride CR, 20 mEq, oral, Daily  pregabalin, 50 mg, oral, TID  psyllium husk (with sugar), 5 g, oral, Daily  tamsulosin, 0.4 mg, oral, BID      Continuous medications     PRN medications  PRN medications: albuterol, famotidine, morphine, naloxone, ondansetron ODT **OR** ondansetron, oxyCODONE, oxyCODONE, oxyCODONE, oxyCODONE    Results for orders placed or performed during the hospital encounter of 10/02/23 (from the past 96 hour(s))   SARS-CoV-2 RT PCR   Result Value Ref Range    Coronavirus 2019, PCR Not Detected Not Detected   CBC   Result Value Ref Range    WBC 8.1 4.4 - 11.3 x10*3/uL    nRBC 0.0 0.0 - 0.0 /100 WBCs    RBC 4.31 (L) 4.50 - 5.90 x10*6/uL    Hemoglobin 12.5 (L) 13.5 - 17.5 g/dL    Hematocrit 38.4 (L) 41.0 - 52.0 %    MCV 89 80 - 100 fL    MCH 29.0 26.0 - 34.0 pg    MCHC 32.6 32.0 - 36.0 g/dL    RDW 15.6 (H) 11.5 - 14.5 %    Platelets 241 150 - 450 x10*3/uL    MPV 10.3 7.5 - 11.5 fL   Basic metabolic panel   Result Value Ref Range    Glucose 98 74 - 99 mg/dL    Sodium 133 (L) 136 - 145 mmol/L    Potassium 4.0 3.5 - 5.3 mmol/L    Chloride 96 (L) 98 - 107 mmol/L    Bicarbonate 27 21 - 32 mmol/L    Anion Gap 14 10 - 20 mmol/L    Urea Nitrogen 18 6 - 23 mg/dL    Creatinine 0.87 0.50 - 1.30 mg/dL    eGFR 87 >60 mL/min/1.73m*2    Calcium 8.7 8.6 - 10.3 mg/dL         Intake/Output Summary (Last 24 hours) at 10/7/2023 0943  Last data filed at 10/7/2023 0456  Gross per 24 hour   Intake 480 ml   Output 1400 ml   Net -920 ml       Assessment and Plan     Assessment/Plan     Principal Problem:    Pseudoclaudication syndrome  Active Problems:    Moderate recurrent major depression (CMS/HCC)    Other specified  hypothyroidism    Glucocorticoid deficiency (CMS/HCC)    Generalized anxiety disorder    Chronic pain    Elevated prostate specific antigen (PSA)    Other emphysema (CMS/HCC)

## 2023-10-07 NOTE — PROGRESS NOTES
Danny Bah is a 82 y.o. male on day 0 of admission presenting with Pseudoclaudication syndrome/sp lumbar laminectomy.    Subjective   Patient is doing much better, denies SOB, cough, his pain is better controlled,   No nausea         Objective     Physical Exam  Constitutional:       Appearance: Normal appearance. He is normal weight.   Cardiovascular:      Rate and Rhythm: Normal rate.   Pulmonary:      Effort: Pulmonary effort is normal.   Abdominal:      General: Abdomen is flat. Bowel sounds are normal. There is no distension.      Palpations: There is no mass.   Musculoskeletal:      Right lower leg: No edema.      Left lower leg: No edema.   Neurological:      Mental Status: He is alert.   Psychiatric:         Mood and Affect: Mood normal.         Thought Content: Thought content normal.         Last Recorded Vitals  Blood pressure 118/63, pulse 74, temperature 37.1 °C (98.8 °F), temperature source Temporal, resp. rate 20, height 1.829 m (6'), weight 97.6 kg (215 lb 2.7 oz), SpO2 93 %.  Intake/Output last 3 Shifts:  I/O last 3 completed shifts:  In: 720 (7.4 mL/kg) [P.O.:720]  Out: 2100 (21.5 mL/kg) [Urine:2100 (0.6 mL/kg/hr)]  Weight: 97.6 kg     Relevant Results                             Assessment/Plan   Medical Problems       Problem List       * (Principal) Pseudoclaudication syndrome    Moderate recurrent major depression (CMS/HCC)    Overview Signed 10/2/2023  5:31 PM by Seda Whitaker MD     Stable on cymbalta         Other specified hypothyroidism    Overview Signed 10/2/2023  5:31 PM by Seda Whitaker MD     On levothyroxine         Glucocorticoid deficiency (CMS/HCC)    Overview Signed 10/3/2023  7:56 AM by Seda Whitaker MD     Will resume hydrocortisone today          Generalized anxiety disorder    Chronic pain    Spinal stenosis of lumbar region at multiple levels    Elevated prostate specific antigen (PSA)    Overview Signed 10/3/2023  7:56 AM by  Seda Whitaker MD     Lee removed, will resume tamsulosin bid         Degeneration of lumbar or lumbosacral intervertebral disc    Chronic gout of multiple sites    Other emphysema (CMS/[facility])    Overview Signed 10/3/2023  7:57 AM by Seda Whitaker MD     On albuterol and prn oxygen , stable               Patient is medically stable       I spent 25 minutes in the professional and overall care of this patient.      Seda Whitaker MD

## 2023-10-07 NOTE — PROGRESS NOTES
Author[facility]i rec'd from [facility]. Needed attending to sign Gold Form to finalize arrangements. This has been done.   Nsg. Notes that pt is able to be transported by ambulette & has no O2. PCN apprised & confirmed transport @ 1230.   Pt. Has been informed, along with nsg.   Called spouse on cell phone, [phone] & informed. She requests that nsg, let her know when pt leaves, as she will meet him at facility. Provided this information to nsg, as well as address & phone # for [facility] in [place], [place]. JO ANN Harrison

## 2023-10-07 NOTE — DISCHARGE INSTRUCTIONS
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]     Procedures    * L3-4, L4-5, L5-S1 DECOMPRESSIVE LAMINECTOMY WITH BILATERAL L5-S1 FORAMINOTOMIES & L4-5 DISC EXCISION WITH FLAT PLATE X-RAY      Surgeons      * Geronimo Vasquez - Primary        Office Locations    [facility]  [address]  [address]  [place] [address]  Phone [phone]  Fax [phone]    [facility]  [address]  [address]  [place] [address]  Phone [phone]  Fax [phone]     [facility]  [address], [address]  [place]  [address]  Phone [phone]  Fax [phone]    Please call the office with any questions regarding your post surgical office visit scheduled. All of your post op visits should be scheduled prior to your discharge from the hospital.      Frequently Asked Questions    Create a social support network.  Recognize after surgery it is paramount to have support from others who can help take care of your daily needs for the first few weeks while you focus on healing.  This includes basic needs such as laundry, grocery shopping, possibly preparing meals, and help with general wellness such as showering and hygiene.    Limit narcotic usage. Taking narcotic pain medications after surgery only as you need to. Make sure to take non-narcotic medication options after surgery including scheduled tylenol and NSAIDs if appropriate.. If you are on narcotic medications before surgery, discuss a plan to minimize the use of the medication after your  date of surgery.    Optimize your nutrition. Physical reserve and fragility is vital to recover from any major surgery. Eating a balanced diet, taking a multivitamin, and maintaining a healthy weight will improve your chances of a faster recovery.     Optimize your other medical conditions. If you are diabetic monitor your blood sugar closely, optimize your diet to prevent spikes in your blood sugar after surgery. Elevated blood sugars can increase your chances of infection and other adverse complications from surgery. If you have osteoporosis make sure you are taking medications as instructed and supplements to improve your chances of having a successful surgery.     Resuming blood thinning medications. If you are taking a medication that thins your blood we routinely resume these post op day #3.    Q: When can I resume smoking?  A: Smoking will increase the risk of breathing related complications after surgery.  We recommend you stop smoking prior to any elective surgery.  If you are undergoing a spinal fusion procedure, smoking cessation is required.  Smoking as well as patch and gum forms of nicotine inhibit bone healing and can increase complication rates from spinal procedures. Do not smoke after your surgery under any circumstances.     Q: How do I care for my wound? When can I shower?  A: Wounds created in surgery are at highest risk for infection in the first month after the procedure. During this time you may be able to get the wound wet in a shower but should not soak in a tub or public pool or hot tub for a month.  Most of our wounds are closed with dissolvable sutures that don't need removal.  If skin glue is used this will fall off naturally within 3 weeks from surgery. Other visible non-absorbable sutures or staples should come out usually around 21 days from surgery.  If your wound edges open up or become very red or firm, let the office know immediately because that may indicate an infection and you  should call the office for further instructions on how to send photos and be evaluated immediately.                                                                  Example of wound infection                          Healthy 6wk MIS TLIF wound    Q: When will my pain improve?    A: Pain from surgical wounds varies depending on their size and the types of surgery.  Small anterior cervical or lumbar microdiscectomy wounds will be sore for a few weeks, but those from larger procedures may take up to 3 months to fully heal.  Nerve pain may dissipate quickly after surgery, however, recovery from long standing compression can take up to a year to show signs of improvement. We can take pressure off the nerves but we cannot force them to heal and everyone's body is different. Longstanding compression can also have caused nerve damage, which may not get better. Only patience and time can tell if the nerves will heal completely in these instances. Nerve or back pain that require fusions may not dissipate completely until the fusion process has occurred and that may range from 3 - 18 months after surgery.      Q: What medicines can I take after surgery?  There are several classes of medicines used in pill form after surgery:  Narcotics: Percocet, Vicodin, Oxycodone, and Tramadol  Anti-inflammatories:  acetaminophen (Tylenol), ibuprofen (Advil), naproxen (Aleve), diclofenac and steroids (methylprednisilone dose pack, prednisone)  Muscle relaxants:  diazepam (Valium), cyclobenzaprine (Flexeril), methocarbamol (Robaxin)    Nerve medicines:  gabapentin (Neurontin), pregabalin (Lyrica) and duloxetine (Cymbalta)   Typically, anti-inflammatories are not used with regularity after a fusion because they slow down bone healing. Steroids can be used in short bursts to calm down an inflamed nerve.  Tylenol/Acetaminophen is commonly added to many narcotics like oxycodone or hydrocodone to form Percocet/Vicodin.   Small amounts of narcotics  are prescribed from our surgeons immediately post-operatively to be taken as directed during the acute recovery period ranging 1-2 weeks for small procedures and 3-4 weeks for large procedures. Prescription narcotic refills need to be reviewed, approved and written by your surgeon and thus require some lead-time to prepare.  Prescriptions for oxycodone (Percocet) cannot be called in (by FDA regulations) and can only be picked up at the day of surgery, upon discharge, or at an office location.     Q: How and when will I get refills?  Refills on narcotics within the first month post-operatively will only be authorized by our surgeons on a case by case basis. Referrals can be made to a licensed physician specializing in pain medication and alternative forms of pain management if your pain is not adequately controlled in the immediate post op period without non-narcotic pain medication. The reason for these restrictions is due to the dangers and abuses often encountered with narcotics.  We need to ensure your safety and make sure that [place] laws are being obeyed.      Non-narcotic medications can be prescribed by our office and using them regularly can help you limit your narcotic medication intake. We will readily prescribe these for the duration of your post-operative care if you primary care physician cannot.  Q: When can I go back to work?  A: Non-fusion surgeries generally have a 4-6 week reduction in physical labor.  Jobs that are office based or light duty can usually be returned to in around 3-4 weeks, occasionally less.  Fusion surgeries usually require 8-12 weeks of time off from physically demanding jobs that require repetitive bending, lifting and twisting (BLTs).  For fusions, many lighter duty jobs can be performed in around 6-8 weeks.  Aside from the imposed restrictions from surgery, individual return to work dates depend on a person's rehabilitation efforts and natural pain recovery abilities.    Q: How  much can I lift? What should I not do?    A: A specific weight limit is not a useful tool in protection of your body after surgery.  Use common sense and stick to objects no more than 25lbs that are capable of being carried easily.  Living things (pets and kids) are floppy and unpredictable (and don't have handles!). Do not walk large or unruly pets in the first few weeks if possible, and do not attempt to lift children from the ground if possible.    Bending over is another daily necessity that must be reduced.  Sitting on the bed and bending forward gently to put on your socks is acceptable but standing and bending down to tie your shoe may be problematic right after surgery.  If you must get down low, try to use your knees or a grabbing device.  Slip on shoes with good traction is a must.  Try to avoid bending, lifting and twisting (BLTs) in combination.  The therapists may recommend a cane or walker while you are in the hospital or rehab.      There is no specific sleep position that is recommended.  Usually whatever is comfortable is acceptable.      It is important to listen to your body when assessing what is acceptable activity.  You will have pain most of the first month to some degree.  Progressive pain that stops you in your tracks should be heeded and restful position sought.  A scheduled dose of medication can help keep inflammation and pain under control.  You will be especially sore upon awakening, this is normal and you will usually loosen up and feel better after some gentle movement.    Q: When can I drive or ride in a car?  When can I travel?  A: There are no laws that state when a person is safe to drive after surgery - it is subject to patient and physician judgment.  It is the responsibility of each person before getting behind the wheel to determine if they can safely operate the vehicle.  Having had recent surgery or being on narcotics will not excuse you in an accident, more likely the  opposite will occur.  We do not recommend driving while taking narcotic medication. Once off narcotic medication, driving is safe as long as blind spots are able to be seen while driving.  A person can ride in a car immediately, but in the first few weeks, trips of 45min or less are recommended.  If a longer trip is necessary, plan to make stops at 1-1.5h intervals to stretch.  Plane trips out of town are not advised before 4 weeks and your first follow up visit. Ask your doctor at that time about the specifics of any other plans you may have.    Q: What do I do with FMLA paperwork? What if my case is a Worker's Compensation Case? What if my injury involves an accident?  A: All FMLA paperwork will be filled out by our office staff and should be submitted prior to the surgery date by any family members applying for time off work while you recover from surgery. Please have all forms faxed to our office so we can assist with the paperwork before your scheduled surgery date.  If your case is part of a worker's compensation case or if your injury is due to an accident we will work with you to ensure your surgery is performed in a timely fashion. We will submit paperwork through our office for preoperative clearance for surgery. However, we will not assume the responsibility as your physician of record for your case. Post-operatively, return to work forms and assessments must be performed by your referring physician. If you do not have a physician of record for your case, we will help you find one prior to your surgery date to help with return to work assessments and paperwork for your claim.

## 2023-10-16 ENCOUNTER — TELEPHONE (OUTPATIENT)
Dept: NEUROSURGERY | Facility: CLINIC | Age: 83
End: 2023-10-16
Payer: MEDICARE

## 2023-10-16 DIAGNOSIS — M48.061 SPINAL STENOSIS OF LUMBAR REGION AT MULTIPLE LEVELS: Primary | ICD-10-CM

## 2023-10-16 DIAGNOSIS — Z98.890 HISTORY OF EXCISION OF LAMINA OF LUMBAR VERTEBRA FOR DECOMPRESSION OF SPINAL CORD: ICD-10-CM

## 2023-10-16 DIAGNOSIS — G89.18 ACUTE POST-OPERATIVE PAIN: Primary | ICD-10-CM

## 2023-10-16 RX ORDER — DULOXETIN HYDROCHLORIDE 60 MG/1
60 CAPSULE, DELAYED RELEASE ORAL DAILY
Qty: 90 CAPSULE | Refills: 0 | Status: SHIPPED | OUTPATIENT
Start: 2023-10-16 | End: 2024-01-14

## 2023-10-16 RX ORDER — PREGABALIN 50 MG/1
50 CAPSULE ORAL 3 TIMES DAILY
Qty: 270 CAPSULE | Refills: 0 | Status: SHIPPED | OUTPATIENT
Start: 2023-10-16 | End: 2024-01-14

## 2023-10-16 RX ORDER — OXYCODONE HYDROCHLORIDE 5 MG/1
5 TABLET ORAL EVERY 6 HOURS PRN
Qty: 15 TABLET | Refills: 0 | Status: SHIPPED | OUTPATIENT
Start: 2023-10-16 | End: 2023-10-25 | Stop reason: SDUPTHER

## 2023-10-16 RX ORDER — DICLOFENAC SODIUM 75 MG/1
75 TABLET, DELAYED RELEASE ORAL 2 TIMES DAILY
Qty: 180 TABLET | Refills: 1 | Status: SHIPPED | OUTPATIENT
Start: 2023-10-16 | End: 2024-04-13

## 2023-10-16 NOTE — TELEPHONE ENCOUNTER
patient requesting medication refill.  Please approve or deny this request.    Rx requested:  Requested Prescriptions     Pending Prescriptions Disp Refills    pregabalin (LYRICA) 50 MG capsule 90 capsule     diclofenac (VOLTAREN) 75 MG EC tablet 60 tablet     DULoxetine (CYMBALTA) 60 MG extended release capsule 30 capsule          Last Office Visit:   8/14/2023      Next Visit Date:  Future Appointments   Date Time Provider [address]   11/13/2023  1:30 PM [address] [facility]

## 2023-10-16 NOTE — PROGRESS NOTES
HISTORY OF PRESENT ILLNESS    Rafy Dunn is a 16y.o. year old male comes in today as new patient for: knee pain B/L    Patients symptoms have been present for a couple months. Pain level 1/10 anterior, It has worsened with gym, running, stairs. Patient has tried:  rest.  It is described as pain anterior knees w/o known cause. Past Surgical History:   Procedure Laterality Date    HX OTHER SURGICAL      tubes in ears    HX OTHER SURGICAL      nose    HX TONSIL AND ADENOIDECTOMY       Social History     Socioeconomic History    Marital status: UNKNOWN     Spouse name: Not on file    Number of children: Not on file    Years of education: Not on file    Highest education level: Not on file   Tobacco Use    Smoking status: Never Smoker    Smokeless tobacco: Never Used   Substance and Sexual Activity    Alcohol use: Not Currently    Drug use: Not Currently      Current Outpatient Medications   Medication Sig Dispense Refill    fexofenadine (ALLEGRA) 180 mg tablet Take  by mouth.  fluticasone propionate (FLONASE ALLERGY RELIEF) 50 mcg/actuation nasal spray 2 Sprays by Both Nostrils route daily.  fluticasone propionate (FLOVENT DISKUS) 100 mcg/actuation dsdv Take  by inhalation.  montelukast (SINGULAIR) 10 mg tablet Take 10 mg by mouth daily. Past Medical History:   Diagnosis Date    Asthma      Family History   Problem Relation Age of Onset    Hypertension Maternal Grandmother     Lung Disease Maternal Grandmother     Heart Disease Maternal Grandfather         cervical         ROS:  No swell. All other systems reviewed and negative aside from that written in the HPI. Objective:  /68   Pulse 96   Temp 98.3 °F (36.8 °C)   Resp 14   Ht 5' 6\" (1.676 m)   Wt 111 lb 3.2 oz (50.4 kg)   BMI 17.95 kg/m²   GEN: Appears stated age in NAD. HEAD:  Normocephalic, atraumatic. NEURO:  Sensation intact light touch B/L lower extremities.   MS:  LE Strength +5/5 bilateral . I have personally reviewed the OARRS report. No suspicious activity noted. This patient receive baseline oral morphine for chronic pain. Current Rx is for post operative pain. This report is scanned into the electronic medical record. I have considered the risks of abuse, dependence, addiction and diversion.     Patient with recent history of major reconstructive surgery (L3 - S1 decompression on 10/02/2023, by Dr. Geronimo Vasquez at [facility]) necessitating narcotic medications at higher doses during the post-operative period of 6 weeks.     Will continue with close monitoring and short course refills.   Patricia Alarcon, APRN-CNP           bilateral  Knee:  No Effusion . Lachman negative. Valgus negative. Varus negative. negative joint line tenderness medial.  Erick negative. Posterior drawer negative. Noble compression test negative. Patellar apprehension negative. Patellar facet positive tender to palpation medial left>right no crepitance bilateral .  Pes anserine negative TTP bilateral   EXT: no clubbing/cyanosis. no edema. SKIN: Warm/dry without rash. HEENT: Conjunctiva/lids WNL. External canals/nares WNL. Tongue midline. PERRL, EOMI. Hearing intact. NECK: Trachea midline. Supple, Full ROM. No thyromegaly. CARDIAC: No edema. LUNGS: Normal effort. ABD: Soft, no masses. No HSM. PSYCH: A+O x3. Appropriate judgment and insight. Assessment/Plan:     ICD-10-CM ICD-9-CM    1. Patellofemoral syndrome, bilateral M22.2X1 719.46 REFERRAL TO PHYSICAL THERAPY    M22.2X2  meloxicam (MOBIC) 7.5 mg tablet       Patient (or guardian if minor) verbalizes understanding of evaluation and plan. Will start PT and use mobic and ice PRN and RTC 4-6 weeks.

## 2023-10-17 RX ORDER — OXYCODONE HYDROCHLORIDE 5 MG/1
5 TABLET ORAL EVERY 4 HOURS PRN
Qty: 28 TABLET | Refills: 0 | Status: CANCELLED
Start: 2023-10-17 | End: 2023-10-24

## 2023-10-18 NOTE — PROGRESS NOTES
Danny Bah is an 82 y.o. male who is here for his 1st post op visit. He underwent L3 - S1 decompression at [facility] by Dr. Geronimo Vasquez on 10/02/2023. He was discharged to SNF on 10/07/2023.    Since discharge from the hospital, he feels that he is overall better, but notes right lumbar radiculopathy. He is tolerating pain medications. Activity level - he is working with     Smoker: No  Anticoagulation: No    On exam:  A&O x 3, speech clear / fluent  Respirations even / unlabored  BARAJAS  SURGICAL INCISION is: well approximated, no erythema / swelling / drainage  Gait is slow / steady with rollator    Danny Bah is progressing well, albeit slowly post operatively. We discussed Medrol Dosepak for radicular symptoms post operatively. He voices understanding of doing / possible side effects and agrees with treatment. Rx sent to pharmacy of choice. He agrees to follow up with Dr. Vasquez as previously scheduled, 11/17/2023.

## 2023-10-19 ENCOUNTER — OFFICE VISIT (OUTPATIENT)
Dept: NEUROSURGERY | Facility: CLINIC | Age: 83
End: 2023-10-19
Payer: MEDICARE

## 2023-10-19 VITALS — DIASTOLIC BLOOD PRESSURE: 72 MMHG | SYSTOLIC BLOOD PRESSURE: 118 MMHG | HEART RATE: 87 BPM | TEMPERATURE: 97.5 F

## 2023-10-19 DIAGNOSIS — M54.16 LUMBAR RADICULOPATHY: ICD-10-CM

## 2023-10-19 DIAGNOSIS — Z98.890 HISTORY OF EXCISION OF LAMINA OF LUMBAR VERTEBRA FOR DECOMPRESSION OF SPINAL CORD: Primary | ICD-10-CM

## 2023-10-19 PROBLEM — M48.062 NEUROGENIC CLAUDICATION DUE TO LUMBAR SPINAL STENOSIS: Status: ACTIVE | Noted: 2023-10-19

## 2023-10-19 PROBLEM — M47.12 MYELOPATHY DUE TO CERVICAL SPONDYLOSIS: Status: ACTIVE | Noted: 2023-10-19

## 2023-10-19 PROCEDURE — 1036F TOBACCO NON-USER: CPT | Performed by: NURSE PRACTITIONER

## 2023-10-19 PROCEDURE — 99024 POSTOP FOLLOW-UP VISIT: CPT | Performed by: NURSE PRACTITIONER

## 2023-10-19 PROCEDURE — 1160F RVW MEDS BY RX/DR IN RCRD: CPT | Performed by: NURSE PRACTITIONER

## 2023-10-19 PROCEDURE — 1125F AMNT PAIN NOTED PAIN PRSNT: CPT | Performed by: NURSE PRACTITIONER

## 2023-10-19 PROCEDURE — 1159F MED LIST DOCD IN RCRD: CPT | Performed by: NURSE PRACTITIONER

## 2023-10-19 RX ORDER — AMOXICILLIN 250 MG
1 CAPSULE ORAL DAILY
COMMUNITY

## 2023-10-19 RX ORDER — MULTIVIT-MIN/FOLIC/VIT K/LYCOP 400-300MCG
70 TABLET ORAL NIGHTLY
COMMUNITY
Start: 2023-07-14 | End: 2023-10-19 | Stop reason: ALTCHOICE

## 2023-10-19 RX ORDER — OXYCODONE HCL 40 MG/1
40 TABLET, FILM COATED, EXTENDED RELEASE ORAL 2 TIMES DAILY
COMMUNITY
Start: 2005-04-25 | End: 2023-10-19 | Stop reason: ALTCHOICE

## 2023-10-19 RX ORDER — POLYETHYLENE GLYCOL 3350 17 G/17G
17 POWDER, FOR SOLUTION ORAL DAILY PRN
COMMUNITY

## 2023-10-19 RX ORDER — NITROFURANTOIN 25; 75 MG/1; MG/1
100 CAPSULE ORAL 2 TIMES DAILY
COMMUNITY
Start: 2017-02-16

## 2023-10-19 RX ORDER — METOCLOPRAMIDE 5 MG/1
5 TABLET ORAL 4 TIMES DAILY
COMMUNITY

## 2023-10-19 RX ORDER — OMEPRAZOLE 20 MG/1
20 TABLET, DELAYED RELEASE ORAL
COMMUNITY
Start: 2005-04-25

## 2023-10-19 RX ORDER — MAGNESIUM 64 MG (MAGNESIUM CHLORIDE) TABLET,DELAYED RELEASE
64 NIGHTLY
COMMUNITY
Start: 2023-01-09

## 2023-10-19 RX ORDER — CHOLECALCIFEROL (VITAMIN D3) 50 MCG
2000 TABLET ORAL DAILY
COMMUNITY

## 2023-10-19 RX ORDER — METOPROLOL SUCCINATE 25 MG/1
0.5 TABLET, EXTENDED RELEASE ORAL DAILY
COMMUNITY
Start: 2023-06-02 | End: 2023-10-19 | Stop reason: ALTCHOICE

## 2023-10-19 RX ORDER — ALBUTEROL SULFATE 90 UG/1
2 AEROSOL, METERED RESPIRATORY (INHALATION) EVERY 4 HOURS PRN
COMMUNITY

## 2023-10-19 RX ORDER — ALBUTEROL SULFATE 0.83 MG/ML
2.5 SOLUTION RESPIRATORY (INHALATION) EVERY 4 HOURS PRN
COMMUNITY

## 2023-10-19 RX ORDER — DIAZEPAM 5 MG/1
5 TABLET ORAL EVERY 6 HOURS PRN
COMMUNITY
End: 2023-10-19 | Stop reason: ALTCHOICE

## 2023-10-19 RX ORDER — METHYLPREDNISOLONE 4 MG/1
TABLET ORAL
Qty: 21 TABLET | Refills: 0 | Status: SHIPPED | OUTPATIENT
Start: 2023-10-19 | End: 2024-04-04 | Stop reason: WASHOUT

## 2023-10-19 RX ORDER — MULTIVITAMIN
1 TABLET ORAL DAILY
COMMUNITY
End: 2023-10-19 | Stop reason: ALTCHOICE

## 2023-10-19 RX ORDER — DICLOFENAC SODIUM 75 MG/1
75 TABLET, DELAYED RELEASE ORAL 2 TIMES DAILY
COMMUNITY
Start: 2016-04-24

## 2023-10-19 RX ORDER — NYSTATIN 100000 [USP'U]/ML
5 SUSPENSION ORAL 4 TIMES DAILY
COMMUNITY
Start: 2023-09-01

## 2023-10-19 RX ORDER — CYCLOBENZAPRINE HCL 5 MG
5 TABLET ORAL DAILY PRN
COMMUNITY
End: 2023-10-19 | Stop reason: ALTCHOICE

## 2023-10-19 RX ORDER — HYDROXYZINE HYDROCHLORIDE 25 MG/1
25 TABLET, FILM COATED ORAL EVERY 6 HOURS PRN
COMMUNITY

## 2023-10-19 ASSESSMENT — PATIENT HEALTH QUESTIONNAIRE - PHQ9
2. FEELING DOWN, DEPRESSED OR HOPELESS: NOT AT ALL
SUM OF ALL RESPONSES TO PHQ9 QUESTIONS 1 & 2: 0
1. LITTLE INTEREST OR PLEASURE IN DOING THINGS: NOT AT ALL

## 2023-10-19 ASSESSMENT — PAIN SCALES - GENERAL: PAINLEVEL: 8

## 2023-10-25 ENCOUNTER — TELEPHONE (OUTPATIENT)
Dept: NEUROSURGERY | Facility: CLINIC | Age: 83
End: 2023-10-25
Payer: MEDICARE

## 2023-10-25 DIAGNOSIS — G89.18 ACUTE POST-OPERATIVE PAIN: ICD-10-CM

## 2023-10-25 DIAGNOSIS — Z98.890 HISTORY OF EXCISION OF LAMINA OF LUMBAR VERTEBRA FOR DECOMPRESSION OF SPINAL CORD: ICD-10-CM

## 2023-10-25 RX ORDER — ELECTROLYTES/DEXTROSE
64 SOLUTION, ORAL ORAL NIGHTLY
Qty: 90 TABLET | Refills: 3 | Status: SHIPPED | OUTPATIENT
Start: 2023-10-25

## 2023-10-25 RX ORDER — OXYCODONE HYDROCHLORIDE 5 MG/1
5 TABLET ORAL EVERY 6 HOURS PRN
Qty: 28 TABLET | Refills: 0 | Status: SHIPPED | OUTPATIENT
Start: 2023-10-25 | End: 2023-11-08 | Stop reason: SDUPTHER

## 2023-10-25 NOTE — PROGRESS NOTES
I have personally reviewed the OARRS report. This report is scanned into the electronic medical record. I have considered the risks of abuse, dependence, addiction and diversion.     Patient with recent history of major reconstructive surgery (L3 - S1 decompression at [facility] by Dr. Geronimo Vasquez on 10/02/2023) necessitating narcotic medications at higher doses during the post-operative period of 6 weeks.     Will continue with close monitoring and short course refills.   Patricia Alarcon, APRN-CNP

## 2023-10-25 NOTE — TELEPHONE ENCOUNTER
Wife is requesting medication refill. She has confirmed the pharmcy. Rx requested:  Requested Prescriptions     Pending Prescriptions Disp Refills    Magnesium Chloride 64 MG TABS 60 tablet      Sig: Take by mouth nightly       Last Office Visit:   8/14/2023    Last Tox screen:    ?     Last Medication contract:    ?    Next Visit Date:  Future Appointments   Date Time Provider [address]   11/13/2023  1:30 PM Jaqueline Oliveros DO [address]

## 2023-10-30 DIAGNOSIS — M48.061 SPINAL STENOSIS OF LUMBAR REGION AT MULTIPLE LEVELS: ICD-10-CM

## 2023-10-30 RX ORDER — MORPHINE SULFATE 30 MG/1
30 TABLET, FILM COATED, EXTENDED RELEASE ORAL EVERY 12 HOURS
Qty: 60 TABLET | Refills: 0 | Status: SHIPPED | OUTPATIENT
Start: 2023-10-30 | End: 2023-11-29

## 2023-10-30 NOTE — TELEPHONE ENCOUNTER
patient requesting medication refill. Please approve or deny this request.    Rx requested:  Requested Prescriptions     Pending Prescriptions Disp Refills    morphine (MS CONTIN) 30 MG extended release tablet 60 tablet 0     Sig: Take 1 tablet by mouth in the morning and 1 tablet in the evening. Do all this for 30 days. Max Daily Amount: 60 mg.          Last Office Visit:   8/14/2023      Next Visit Date:  Future Appointments   Date Time Provider [address] [place] [address]   11/13/2023  1:30 PM [address] [place] [address] [facility] Counseling:  I discussed with the patient the risks of mycophenolate mofetil including but not limited to infection/immunosuppression, GI upset, hypokalemia, hypercholesterolemia, bone marrow suppression, lymphoproliferative disorders, malignancy, GI ulceration/bleed/perforation, colitis, interstitial lung disease, kidney failure, progressive multifocal leukoencephalopathy, and birth defects.  The patient understands that monitoring is required including a baseline creatinine and regular CBC testing. In addition, patient must alert us immediately if symptoms of infection or other concerning signs are noted.

## 2023-11-02 ENCOUNTER — TELEPHONE (OUTPATIENT)
Dept: FAMILY MEDICINE CLINIC | Age: 83
End: 2023-11-02

## 2023-11-02 NOTE — TELEPHONE ENCOUNTER
[place] calling from [facility] calling to leave message about patient says  discharged patient from [facility] physical therapy today but the RN is still active.

## 2023-11-07 ENCOUNTER — TELEPHONE (OUTPATIENT)
Dept: FAMILY MEDICINE CLINIC | Age: 83
End: 2023-11-07

## 2023-11-07 NOTE — TELEPHONE ENCOUNTER
Odalis from [facility] calling with message     Patient has been discharged from care as of today 11/07/23 with goal reached    Thank You

## 2023-11-08 ENCOUNTER — TELEPHONE (OUTPATIENT)
Dept: NEUROSURGERY | Facility: CLINIC | Age: 83
End: 2023-11-08
Payer: MEDICARE

## 2023-11-08 DIAGNOSIS — G89.18 ACUTE POST-OPERATIVE PAIN: ICD-10-CM

## 2023-11-08 DIAGNOSIS — Z98.890 HISTORY OF EXCISION OF LAMINA OF LUMBAR VERTEBRA FOR DECOMPRESSION OF SPINAL CORD: ICD-10-CM

## 2023-11-08 RX ORDER — OXYCODONE HYDROCHLORIDE 5 MG/1
5 TABLET ORAL EVERY 6 HOURS PRN
Qty: 28 TABLET | Refills: 0 | Status: SHIPPED | OUTPATIENT
Start: 2023-11-08 | End: 2023-11-15

## 2023-11-08 NOTE — PROGRESS NOTES
I have personally reviewed the OARRS report. No suspicious activity is noted. He is on LONG TERM OPIATE through pain management. Current Rx is for acute post operative pain.This report is scanned into the electronic medical record. I have considered the risks of abuse, dependence, addiction and diversion.     Patient with recent history of major reconstructive surgery (L3 - S1 decompression by Dr. Geronimo Vasquez at [facility] on 10/02/2023) necessitating narcotic medications at higher doses during the post-operative period of 6 weeks.     Will continue with close monitoring and short course refills.   Patricia Alarcon, APRN-CNP

## 2023-11-13 ENCOUNTER — TELEMEDICINE (OUTPATIENT)
Dept: FAMILY MEDICINE CLINIC | Age: 83
End: 2023-11-13

## 2023-11-13 DIAGNOSIS — U07.1 COVID-19 VIRUS INFECTION: Primary | ICD-10-CM

## 2023-11-13 DIAGNOSIS — M48.061 SPINAL STENOSIS OF LUMBAR REGION AT MULTIPLE LEVELS: ICD-10-CM

## 2023-11-13 RX ORDER — IRON POLYSACCHARIDE COMPLEX 150 MG
150 CAPSULE ORAL DAILY
COMMUNITY

## 2023-11-13 RX ORDER — DOXYCYCLINE HYCLATE 100 MG/1
CAPSULE ORAL
COMMUNITY
Start: 2023-11-10

## 2023-11-13 NOTE — PROGRESS NOTES
Bo Son, was evaluated through a synchronous (real-time) audio-video encounter. The patient (or guardian if applicable) is aware that this is a billable service, which includes applicable co-pays. This Virtual Visit was conducted with patient's (and/or legal guardian's) consent. Patient identification was verified, and a caregiver was present when appropriate. The patient was located at Home: [address] [place] [address]  Provider was located at [facility] (Appt Dept): [address] [address], [address] [facility]      Balbina Grigsby (:  1940) is a Established patient, presenting virtually for evaluation of the following:    Assessment & Plan   Below is the assessment and plan developed based on review of pertinent history, physical exam, labs, studies, and medications. 1. COVID-19 virus infection  -     nirmatrelvir/ritonavir 300/100 (PAXLOVID) 20 x 150 MG & 10 x 100MG TBPK; Take 3 tablets (two 150 mg nirmatrelvir and one 100 mg ritonavir tablets) by mouth every 12 hours for 5 days. , Disp-30 tablet, R-0Normal  2. Spinal stenosis of lumbar region at multiple levels  S/p laminectomy and fusion 10/2. Still on Oxy IR and his Long acting morphine. No follow-ups on file. Subjective   HPI  Review of Systems  C/O sinus congestion, dry cough, SOB, wheezing, sore throat, and ear pain. Tested positive for COVID with an at home test this morning. Denies fever, nausea, vomiting, or diarrhea. Oxygen level is currently 93. BP at noon was 120/70, pulse was 91. He was hospitalized last week due to cellulitis and pneumonia. The other symptoms started yesterday. He was discharged from the hospital on Doxycycline, which he continues to take as directed. He has not been taking anything additionally OTC for relief.       Objective   Patient-Reported Vitals  No data recorded     Physical Exam  [INSTRUCTIONS:  \"[x]\" Indicates a positive item  \"[]\" Indicates a negative item  -- DELETE ALL ITEMS NOT

## 2023-11-15 ENCOUNTER — TELEPHONE (OUTPATIENT)
Dept: FAMILY MEDICINE CLINIC | Age: 83
End: 2023-11-15

## 2023-11-15 NOTE — TELEPHONE ENCOUNTER
PT is currently on Paxlovid- he has been having hot flashes and was wondering if there is something additional that he could take.

## 2023-11-20 RX ORDER — ALLOPURINOL 300 MG/1
300 TABLET ORAL DAILY
Qty: 90 TABLET | Refills: 3 | Status: SHIPPED | OUTPATIENT
Start: 2023-11-20

## 2023-11-20 NOTE — TELEPHONE ENCOUNTER
MEDICATION REFILL REQUEST     Rx Requested    Requested Prescriptions     Pending Prescriptions Disp Refills    allopurinol (ZYLOPRIM) 300 MG tablet 30 tablet          Patient's Last Office Visit   11/13/2023      Patient's Next Office Visit   Future Appointments   Date Time Provider [address] [place] [address]   2/13/2024  2:00 PM Yoli De La Cruz DO [address] [facility]         Other comments

## 2023-11-21 ENCOUNTER — TELEPHONE (OUTPATIENT)
Dept: FAMILY MEDICINE CLINIC | Age: 83
End: 2023-11-21

## 2023-11-21 NOTE — TELEPHONE ENCOUNTER
Patient called about the hot flashes he is having. He wants to know if there is a testosterone substitution he can be put on. Please advise, thank you.

## 2023-11-27 ENCOUNTER — TELEPHONE (OUTPATIENT)
Dept: FAMILY MEDICINE CLINIC | Age: 83
End: 2023-11-27

## 2023-11-27 DIAGNOSIS — M48.061 SPINAL STENOSIS OF LUMBAR REGION AT MULTIPLE LEVELS: ICD-10-CM

## 2023-11-27 RX ORDER — MORPHINE SULFATE 30 MG/1
30 TABLET, FILM COATED, EXTENDED RELEASE ORAL EVERY 12 HOURS
Qty: 60 TABLET | Refills: 0 | Status: SHIPPED | OUTPATIENT
Start: 2023-11-27 | End: 2023-12-27

## 2023-11-27 NOTE — TELEPHONE ENCOUNTER
Requested Prescriptions     Pending Prescriptions Disp Refills    morphine (MS CONTIN) 30 MG extended release tablet 60 tablet 0     Sig: Take 1 tablet by mouth in the morning and 1 tablet in the evening. Do all this for 30 days. Max Daily Amount: 60 mg.

## 2023-11-27 NOTE — TELEPHONE ENCOUNTER
Patient called requesting a letter stating what is physical limitations are for his wheelchair. He said one was done in September but he was sick and in & out of the hospital. The letter was only good for 6 weeks. They are requesting an updated version of the same letter. He needs it to be faxed to [facility]. He did not have a fax number. Thank you.

## 2023-12-01 ENCOUNTER — OFFICE VISIT (OUTPATIENT)
Dept: NEUROSURGERY | Facility: CLINIC | Age: 83
End: 2023-12-01
Payer: MEDICARE

## 2023-12-01 VITALS
WEIGHT: 209.8 LBS | RESPIRATION RATE: 22 BRPM | HEIGHT: 72 IN | DIASTOLIC BLOOD PRESSURE: 72 MMHG | SYSTOLIC BLOOD PRESSURE: 112 MMHG | TEMPERATURE: 97.3 F | HEART RATE: 56 BPM | BODY MASS INDEX: 28.42 KG/M2

## 2023-12-01 DIAGNOSIS — M51.37 DEGENERATION OF LUMBAR OR LUMBOSACRAL INTERVERTEBRAL DISC: ICD-10-CM

## 2023-12-01 DIAGNOSIS — M48.061 SPINAL STENOSIS OF LUMBAR REGION AT MULTIPLE LEVELS: ICD-10-CM

## 2023-12-01 DIAGNOSIS — M48.062 NEUROGENIC CLAUDICATION DUE TO LUMBAR SPINAL STENOSIS: Primary | ICD-10-CM

## 2023-12-01 DIAGNOSIS — M48.062 PSEUDOCLAUDICATION SYNDROME: ICD-10-CM

## 2023-12-01 PROCEDURE — 1125F AMNT PAIN NOTED PAIN PRSNT: CPT | Performed by: NEUROLOGICAL SURGERY

## 2023-12-01 PROCEDURE — 1160F RVW MEDS BY RX/DR IN RCRD: CPT | Performed by: NEUROLOGICAL SURGERY

## 2023-12-01 PROCEDURE — 99024 POSTOP FOLLOW-UP VISIT: CPT | Performed by: NEUROLOGICAL SURGERY

## 2023-12-01 PROCEDURE — 1159F MED LIST DOCD IN RCRD: CPT | Performed by: NEUROLOGICAL SURGERY

## 2023-12-01 PROCEDURE — 1036F TOBACCO NON-USER: CPT | Performed by: NEUROLOGICAL SURGERY

## 2023-12-01 ASSESSMENT — PAIN SCALES - GENERAL: PAINLEVEL: 7

## 2023-12-01 NOTE — PROGRESS NOTES
[facility] [place]  Department of Neurological Surgery  Post Operative Patient Visit      History of Present Illness:  Danny Bah is a 83 y.o. year old male who presents to the spine clinic in a post operative visit.     They are status post decompressive lumbar laminectomy L3-4, L4-5, L5-S1 with an L4-5 disc excision.  The patient is now able to lie flat and sleep in the bed.  He is improving significantly.  He does still however, have right lateral thigh pain to the knee.  Although his incision is well-healed and he is 8 weeks out I would like to wait until he is at least 3 months out before sending him to pain management for a transforaminal epidural steroid injection at L3 or and/or L4 on the right.  I do think it is time to get him into some physical therapy.  We are going to have them do general back rehab work, posture training, lower extremity strengthening and some passive modalities including heat massage and ultrasound.  I also believe that although we have made improvements with the surgery he is still very unsteady on his feet and I think he would benefit from an electric scooter.  I told him that I do not know how to order that but I think that we should be on the record is recommending for him.    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  No past surgical history on file.  Social History     Tobacco Use    Smoking status: Never    Smokeless tobacco: Never   Substance Use Topics    Alcohol use: Not Currently     Comment: Rarely     family history includes Hypertension in his mother; Lung cancer in his father.    Current Outpatient Medications:     albuterol 2.5 mg /3 mL (0.083 %) nebulizer solution, Take 3 mL (2.5 mg) by nebulization every 4 hours if needed for wheezing., Disp: , Rfl:     albuterol 90 mcg/actuation inhaler, Inhale 2 puffs every 4 hours if needed., Disp: , Rfl:     allopurinol (Zyloprim) 300 mg tablet, Take 1 tablet (300 mg) by mouth once daily., Disp: , Rfl:     calcium carbonate-vitamin D3 500 mg-5 mcg (200 unit) tablet, Take 1 tablet by mouth once daily., Disp: , Rfl:     cholecalciferol (Vitamin D-3) 50 MCG (2000 UT) tablet, Take 1 tablet (2,000 Units) by mouth once daily., Disp: , Rfl:     cyclobenzaprine (Flexeril) 10 mg tablet, Take 1 tablet (10 mg) by mouth 3 times a day as needed for muscle spasms., Disp: , Rfl:     diclofenac (Voltaren) 75 mg EC tablet, Take 1 tablet (75 mg) by mouth twice a day., Disp: , Rfl:     DULoxetine (Cymbalta) 60 mg DR capsule, Take 1 capsule (60 mg) by mouth once daily. Do not crush or chew., Disp: , Rfl:     furosemide (Lasix) 40 mg tablet, Take by mouth., Disp: , Rfl:     hydrocortisone (Cortef) 10 mg tablet, Take by mouth once daily., Disp: , Rfl:     hydrOXYzine HCL (Atarax) 25 mg tablet, Take 1 tablet (25 mg) by mouth every 6 hours if needed for anxiety., Disp: , Rfl:     iron polysaccharides (Nu-Iron,Niferex) 150 mg iron capsule, Take 1 capsule (150 mg) by mouth once daily., Disp: , Rfl:     levothyroxine (Synthroid, Levoxyl) 150 mcg tablet, Take 1 tablet (150 mcg) by mouth once daily in the morning. Take before meals., Disp: , Rfl:     Mag 64 64 mg EC tablet, Take 1 tablet (64 mg) by mouth once daily at bedtime., Disp: , Rfl:     multivitamin/iron/folic acid (CENTRUM ORAL), Take by  mouth once daily., Disp: , Rfl:     naloxone (Narcan) 1 mg/mL injection, Infuse 0.2 mL (0.2 mg) into a venous catheter every 5 minutes if needed for respiratory depression. May repeat every 2-3 minutes as needed until medical assistance available., Disp: 4 mL, Rfl: prn    nystatin (Mycostatin) 100,000 unit/mL suspension, Take 5 mL (500,000 Units) by mouth 4 times a day.  Retain in mouth as long as possible prior to swallowing., Disp: , Rfl:     omeprazole OTC (PriLOSEC OTC) 20 mg EC tablet, Take 1 tablet (20 mg) by mouth once daily in the morning. Take before meals., Disp: , Rfl:     oxygen (O2) gas therapy, Inhale. Inhale into the lungs 2 LPM at night due to restrictive lung disease, Disp: , Rfl:     polyethylene glycol (Glycolax, Miralax) packet, Take 17 g by mouth once daily as needed (constipation)., Disp: , Rfl:     potassium chloride ER (Micro-K) 10 mEq ER capsule, Take 1 capsule (10 mEq) by mouth 2 times a day. Do not crush or chew., Disp: , Rfl:     pregabalin (Lyrica) 50 mg capsule, Take 1 capsule (50 mg) by mouth 3 times a day., Disp: , Rfl:     psyllium (Metamucil) 3.4 gram packet, Take 1 packet by mouth once daily., Disp: , Rfl:     sennosides-docusate sodium (Vandana-Colace) 8.6-50 mg tablet, Take 1 tablet by mouth once daily., Disp: , Rfl:     tamsulosin (Flomax) 0.4 mg 24 hr capsule, Take 1 capsule (0.4 mg) by mouth 2 times a day., Disp: , Rfl:     methylPREDNISolone (Medrol Dospak) 4 mg tablets, Take each day's dose with one meal each day (Patient not taking: Reported on 12/1/2023), Disp: 21 tablet, Rfl: 0    metoclopramide (Reglan) 5 mg tablet, Take 1 tablet (5 mg) by mouth 4 times a day., Disp: , Rfl:     nitrofurantoin, macrocrystal-monohydrate, (Macrobid) 100 mg capsule, Take 1 capsule (100 mg) by mouth twice a day., Disp: , Rfl:     simvastatin (FLOLIPID ORAL), Take 0.4 mg by mouth once daily., Disp: , Rfl:   Allergies   Allergen Reactions    Penicillins Rash       Physical Examination:    He was  able to stand today and keep his balance.  His wound is well-healed.  His lower extremity strength seems to be 5 out of 5.  His sensorium is actually sharper than I remember it being at any time previously.    Results  I personally reviewed and interpreted the imaging results which included no images today    Assessment/Plan   Danny Bah is a 83 y.o. year old male who presents to the spine clinic in a post operative visit. Since surgery they are making slow steady progress.  We will see him back at his 3-month postop visit.  If the right leg is still a problem we will send him to our pain management colleagues to consider right transforaminal injections at L3 and/or L4.  I am going to give him a physical therapy prescription today with the recommendations I gave above.      The above clinical summary has been dictated with voice recognition software. It has not been proofread for grammatical errors, typographical mistakes, or other semantic inconsistencies.    Thank you for visiting our office today. It was our pleasure to take part in your healthcare.     Do not hesitate to call with any questions regarding your plan of care after leaving. My office can be reached at [phone] M-B 8am-4pm.     To clinicians, thank you very much for this kind referral. It is a privilege to partner with you in the care of your patients. My office would be delighted to assist you with any further consultations or with questions regarding the plan of care outlined. Do not hesitate to call the office or contact me directly.     Sincerely,    Geronimo Vasquez MD, FAANS, FACS  Board Certified Neurological Surgeon  , Department of Neurological Surgery  [facility] [employer] [facility]    [facility]  [address]  [address]  [place] [address]    [facility]  [address]  [address]  [place] [address]    Phone: [phone]  Fax: [phone]

## 2023-12-06 NOTE — TELEPHONE ENCOUNTER
Jefry Espinoza calling asking for a Order for a wheelchair with DX Codes Office Notes, Therapy Order     [phone]

## 2023-12-06 NOTE — TELEPHONE ENCOUNTER
Patient requesting medication refill.  Please approve or deny this request.    Rx requested:  Requested Prescriptions     Pending Prescriptions Disp Refills    cyclobenzaprine (FLEXERIL) 10 MG tablet 30 tablet 3     Sig: TAKE 1 TABLET BY MOUTH THREE TIMES DAILY AS NEEDED for spasm         Last Office Visit:   11/13/2023      Next Visit Date:  Future Appointments   Date Time Provider [address]   2/13/2024  2:00 PM Virginie Van DO [address]

## 2023-12-07 RX ORDER — CYCLOBENZAPRINE HCL 10 MG
TABLET ORAL
Qty: 30 TABLET | Refills: 3 | Status: SHIPPED | OUTPATIENT
Start: 2023-12-07

## 2023-12-11 ENCOUNTER — TELEPHONE (OUTPATIENT)
Dept: FAMILY MEDICINE CLINIC | Age: 83
End: 2023-12-11

## 2023-12-11 NOTE — TELEPHONE ENCOUNTER
Patient called to let you know that Dr. Ted Rivas ordered bw to check his testosterone. The results will be forwarded to our office. Also, Dr. Vitaliy Lassiter had given patient oxycodone for 6 weeks. He will not give patient anymore after that. Patient is asking if you will continue to prescribe the oxycodone for him. Patient states that he still has a lot of pain since the surgery. Please advise, thank you.

## 2023-12-12 RX ORDER — DULOXETIN HYDROCHLORIDE 60 MG/1
60 CAPSULE, DELAYED RELEASE ORAL DAILY
Qty: 90 CAPSULE | Refills: 0 | Status: SHIPPED | OUTPATIENT
Start: 2023-12-12 | End: 2024-03-11

## 2023-12-12 NOTE — TELEPHONE ENCOUNTER
patient requesting medication refill.  Please approve or deny this request.    Rx requested:  Requested Prescriptions     Pending Prescriptions Disp Refills    DULoxetine (CYMBALTA) 60 MG extended release capsule 90 capsule 0     Sig: Take 1 capsule by mouth daily         Last Office Visit:   11/13/2023      Next Visit Date:  Future Appointments   Date Time Provider [address]   2/13/2024  2:00 PM Rogerio Escalante DO [address]

## 2023-12-27 DIAGNOSIS — M48.061 SPINAL STENOSIS OF LUMBAR REGION AT MULTIPLE LEVELS: ICD-10-CM

## 2023-12-27 RX ORDER — MORPHINE SULFATE 30 MG/1
30 TABLET, FILM COATED, EXTENDED RELEASE ORAL EVERY 12 HOURS
Qty: 60 TABLET | Refills: 0 | Status: SHIPPED | OUTPATIENT
Start: 2023-12-27 | End: 2024-01-26

## 2023-12-27 NOTE — TELEPHONE ENCOUNTER
patient requesting medication refill. Please approve or deny this request.    Rx requested:  Requested Prescriptions     Pending Prescriptions Disp Refills    morphine (MS CONTIN) 30 MG extended release tablet 60 tablet 0     Sig: Take 1 tablet by mouth in the morning and 1 tablet in the evening. Do all this for 30 days. Max Daily Amount: 60 mg.          Last Office Visit:   11/13/2023      Next Visit Date:  Future Appointments   Date Time Provider [address]   2/13/2024  2:00 PM Ricardo Warner DO [address]

## 2024-01-04 ENCOUNTER — OFFICE VISIT (OUTPATIENT)
Dept: NEUROSURGERY | Facility: CLINIC | Age: 84
End: 2024-01-04
Payer: MEDICARE

## 2024-01-04 VITALS — TEMPERATURE: 98.4 F | SYSTOLIC BLOOD PRESSURE: 100 MMHG | HEART RATE: 91 BPM | DIASTOLIC BLOOD PRESSURE: 70 MMHG

## 2024-01-04 DIAGNOSIS — M51.37 DEGENERATION OF LUMBAR OR LUMBOSACRAL INTERVERTEBRAL DISC: Primary | ICD-10-CM

## 2024-01-04 DIAGNOSIS — M48.061 SPINAL STENOSIS OF LUMBAR REGION AT MULTIPLE LEVELS: ICD-10-CM

## 2024-01-04 DIAGNOSIS — M48.062 PSEUDOCLAUDICATION SYNDROME: ICD-10-CM

## 2024-01-04 PROCEDURE — 1036F TOBACCO NON-USER: CPT | Performed by: NEUROLOGICAL SURGERY

## 2024-01-04 PROCEDURE — 1125F AMNT PAIN NOTED PAIN PRSNT: CPT | Performed by: NEUROLOGICAL SURGERY

## 2024-01-04 PROCEDURE — 99024 POSTOP FOLLOW-UP VISIT: CPT | Performed by: NEUROLOGICAL SURGERY

## 2024-01-04 ASSESSMENT — PATIENT HEALTH QUESTIONNAIRE - PHQ9
2. FEELING DOWN, DEPRESSED OR HOPELESS: NOT AT ALL
SUM OF ALL RESPONSES TO PHQ9 QUESTIONS 1 AND 2: 0
1. LITTLE INTEREST OR PLEASURE IN DOING THINGS: NOT AT ALL

## 2024-01-04 ASSESSMENT — ENCOUNTER SYMPTOMS: OCCASIONAL FEELINGS OF UNSTEADINESS: 1

## 2024-01-04 ASSESSMENT — PAIN SCALES - GENERAL: PAINLEVEL: 5

## 2024-01-04 NOTE — PROGRESS NOTES
[facility] [place]  Department of Neurological Surgery  Post Operative Patient Visit      History of Present Illness:  Danny Bah is a 83 y.o. year old male who presents to the spine clinic in a post operative visit.     They are status post a decompressive lumbar laminectomy from L3-4, L4-5 and L5-S1 with an L4-5 disc excision.  He feels that he still has some significant cramping that happens whenever he stands and walks for too long mostly gets them on the hips and the sides of his thighs.  Tremendous amount of the pain that went down the back of his thighs is gone away.  We were delayed in getting him in physical therapy.  So he has recently just started.  My recommendation is that we continue physical therapy and over the next several weeks we wean him out of the brace.  I did tell him that he can use the brace when he is getting up for several hours at a time but not to use it regularly.  In 3 months we will see him back and if he still having some of these problems we will repeat a set of plain x-rays including flexion and extension views and we will get an MRI of his lumbar spine.    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  No past surgical history on file.  Social History     Tobacco Use    Smoking status: Never    Smokeless tobacco: Never    Substance Use Topics    Alcohol use: Not Currently     Comment: Rarely     family history includes Hypertension in his mother; Lung cancer in his father.    Current Outpatient Medications:     albuterol 2.5 mg /3 mL (0.083 %) nebulizer solution, Take 3 mL (2.5 mg) by nebulization every 4 hours if needed for wheezing., Disp: , Rfl:     albuterol 90 mcg/actuation inhaler, Inhale 2 puffs every 4 hours if needed., Disp: , Rfl:     allopurinol (Zyloprim) 300 mg tablet, Take 1 tablet (300 mg) by mouth once daily., Disp: , Rfl:     calcium carbonate-vitamin D3 500 mg-5 mcg (200 unit) tablet, Take 1 tablet by mouth once daily., Disp: , Rfl:     cholecalciferol (Vitamin D-3) 50 MCG (2000 UT) tablet, Take 1 tablet (2,000 Units) by mouth once daily., Disp: , Rfl:     cyclobenzaprine (Flexeril) 10 mg tablet, Take 1 tablet (10 mg) by mouth 3 times a day as needed for muscle spasms., Disp: , Rfl:     diclofenac (Voltaren) 75 mg EC tablet, Take 1 tablet (75 mg) by mouth twice a day., Disp: , Rfl:     DULoxetine (Cymbalta) 60 mg DR capsule, Take 1 capsule (60 mg) by mouth once daily. Do not crush or chew., Disp: , Rfl:     furosemide (Lasix) 40 mg tablet, Take by mouth., Disp: , Rfl:     hydrocortisone (Cortef) 10 mg tablet, Take by mouth once daily., Disp: , Rfl:     hydrOXYzine HCL (Atarax) 25 mg tablet, Take 1 tablet (25 mg) by mouth every 6 hours if needed for anxiety., Disp: , Rfl:     iron polysaccharides (Nu-Iron,Niferex) 150 mg iron capsule, Take 1 capsule (150 mg) by mouth once daily., Disp: , Rfl:     levothyroxine (Synthroid, Levoxyl) 150 mcg tablet, Take 1 tablet (150 mcg) by mouth once daily in the morning. Take before meals., Disp: , Rfl:     Mag 64 64 mg EC tablet, Take 1 tablet (64 mg) by mouth once daily at bedtime., Disp: , Rfl:     methylPREDNISolone (Medrol Dospak) 4 mg tablets, Take each day's dose with one meal each day (Patient not taking: Reported on 12/1/2023), Disp: 21 tablet, Rfl: 0    metoclopramide  (Reglan) 5 mg tablet, Take 1 tablet (5 mg) by mouth 4 times a day., Disp: , Rfl:     multivitamin/iron/folic acid (CENTRUM ORAL), Take by mouth once daily., Disp: , Rfl:     naloxone (Narcan) 1 mg/mL injection, Infuse 0.2 mL (0.2 mg) into a venous catheter every 5 minutes if needed for respiratory depression. May repeat every 2-3 minutes as needed until medical assistance available., Disp: 4 mL, Rfl: prn    nitrofurantoin, macrocrystal-monohydrate, (Macrobid) 100 mg capsule, Take 1 capsule (100 mg) by mouth twice a day., Disp: , Rfl:     nystatin (Mycostatin) 100,000 unit/mL suspension, Take 5 mL (500,000 Units) by mouth 4 times a day.  Retain in mouth as long as possible prior to swallowing., Disp: , Rfl:     omeprazole OTC (PriLOSEC OTC) 20 mg EC tablet, Take 1 tablet (20 mg) by mouth once daily in the morning. Take before meals., Disp: , Rfl:     oxygen (O2) gas therapy, Inhale. Inhale into the lungs 2 LPM at night due to restrictive lung disease, Disp: , Rfl:     polyethylene glycol (Glycolax, Miralax) packet, Take 17 g by mouth once daily as needed (constipation)., Disp: , Rfl:     potassium chloride ER (Micro-K) 10 mEq ER capsule, Take 1 capsule (10 mEq) by mouth 2 times a day. Do not crush or chew., Disp: , Rfl:     pregabalin (Lyrica) 50 mg capsule, Take 1 capsule (50 mg) by mouth 3 times a day., Disp: , Rfl:     psyllium (Metamucil) 3.4 gram packet, Take 1 packet by mouth once daily., Disp: , Rfl:     sennosides-docusate sodium (Vandana-Colace) 8.6-50 mg tablet, Take 1 tablet by mouth once daily., Disp: , Rfl:     simvastatin (FLOLIPID ORAL), Take 0.4 mg by mouth once daily., Disp: , Rfl:     tamsulosin (Flomax) 0.4 mg 24 hr capsule, Take 1 capsule (0.4 mg) by mouth 2 times a day., Disp: , Rfl:   Allergies   Allergen Reactions    Penicillins Rash       Physical Examination:      Incisions well-healed.  He stands easily.  He is doing better than he was prior to the surgery but I think he still has some  improvement to go.  I think the spasms are related to the L4-5 below out of his disc.  I think that eventually the sclerosis will stabilize that level and some of his spasms will diminish.  I think it is important for the physical therapy to continue on core strengthening as well as posture work.    Results  I personally reviewed and interpreted the imaging results which included no new imaging today.    Assessment/Plan   Danny Bah is a 83 y.o. year old male who presents to the spine clinic in a post operative visit. Since surgery they are slowly making headway in improving daily.  We will see him back in another 3 months after he completes some physical therapy.  We will get a set of plain x-rays before that visit.  During that visit if he is having trouble we will go ahead and order an MRI for the future.      The above clinical summary has been dictated with voice recognition software. It has not been proofread for grammatical errors, typographical mistakes, or other semantic inconsistencies.    Thank you for visiting our office today. It was our pleasure to take part in your healthcare.     Do not hesitate to call with any questions regarding your plan of care after leaving. My office can be reached at [phone] M-F 8am-4pm.     To clinicians, thank you very much for this kind referral. It is a privilege to partner with you in the care of your patients. My office would be delighted to assist you with any further consultations or with questions regarding the plan of care outlined. Do not hesitate to call the office or contact me directly.     Sincerely,    Geronimo Vasquez MD, FAANS, FACS  Board Certified Neurological Surgeon  , Department of Neurological Surgery  [facility]    [facility]  [address]  [employer] [facility] [address]  [place] [id number]    [facility]  [address]  [address]  [place] [address]    Phone: [phone]  Fax: [phone]

## 2024-01-18 NOTE — TELEPHONE ENCOUNTER
Pt requesting medication refill. Please approve or deny this request.    Rx requested:  Requested Prescriptions     Pending Prescriptions Disp Refills    guanFACINE HCl 2 MG TABS 30 tablet      Sig: Take 1 tablet by mouth nightly         Last Office Visit:   11/13/2023      Next Visit Date:  Future Appointments   Date Time Provider Department Center   2/13/2024  2:00 PM Arnaud Plunkett DO [facility] [place]

## 2024-01-19 RX ORDER — GUANFACINE 2 MG/1
1 TABLET ORAL NIGHTLY
Qty: 30 TABLET | Refills: 0 | Status: SHIPPED | OUTPATIENT
Start: 2024-01-19

## 2024-01-22 RX ORDER — DULOXETIN HYDROCHLORIDE 60 MG/1
60 CAPSULE, DELAYED RELEASE ORAL DAILY
Qty: 90 CAPSULE | Refills: 3 | Status: SHIPPED | OUTPATIENT
Start: 2024-01-22

## 2024-01-27 ASSESSMENT — PATIENT HEALTH QUESTIONNAIRE - PHQ9
4. FEELING TIRED OR HAVING LITTLE ENERGY: 1
SUM OF ALL RESPONSES TO PHQ9 QUESTIONS 1 & 2: 0
1. LITTLE INTEREST OR PLEASURE IN DOING THINGS: 0
7. TROUBLE CONCENTRATING ON THINGS, SUCH AS READING THE NEWSPAPER OR WATCHING TELEVISION: 2
7. TROUBLE CONCENTRATING ON THINGS, SUCH AS READING THE NEWSPAPER OR WATCHING TELEVISION: MORE THAN HALF THE DAYS
8. MOVING OR SPEAKING SO SLOWLY THAT OTHER PEOPLE COULD HAVE NOTICED. OR THE OPPOSITE, BEING SO FIGETY OR RESTLESS THAT YOU HAVE BEEN MOVING AROUND A LOT MORE THAN USUAL: 0
2. FEELING DOWN, DEPRESSED OR HOPELESS: 0
10. IF YOU CHECKED OFF ANY PROBLEMS, HOW DIFFICULT HAVE THESE PROBLEMS MADE IT FOR YOU TO DO YOUR WORK, TAKE CARE OF THINGS AT HOME, OR GET ALONG WITH OTHER PEOPLE: 0
SUM OF ALL RESPONSES TO PHQ QUESTIONS 1-9: 7
2. FEELING DOWN, DEPRESSED OR HOPELESS: NOT AT ALL
5. POOR APPETITE OR OVEREATING: 1
8. MOVING OR SPEAKING SO SLOWLY THAT OTHER PEOPLE COULD HAVE NOTICED. OR THE OPPOSITE - BEING SO FIDGETY OR RESTLESS THAT YOU HAVE BEEN MOVING AROUND A LOT MORE THAN USUAL: NOT AT ALL
9. THOUGHTS THAT YOU WOULD BE BETTER OFF DEAD, OR OF HURTING YOURSELF: NOT AT ALL
5. POOR APPETITE OR OVEREATING: SEVERAL DAYS
SUM OF ALL RESPONSES TO PHQ QUESTIONS 1-9: 7
10. IF YOU CHECKED OFF ANY PROBLEMS, HOW DIFFICULT HAVE THESE PROBLEMS MADE IT FOR YOU TO DO YOUR WORK, TAKE CARE OF THINGS AT HOME, OR GET ALONG WITH OTHER PEOPLE: NOT DIFFICULT AT ALL
SUM OF ALL RESPONSES TO PHQ QUESTIONS 1-9: 7
1. LITTLE INTEREST OR PLEASURE IN DOING THINGS: NOT AT ALL
6. FEELING BAD ABOUT YOURSELF - OR THAT YOU ARE A FAILURE OR HAVE LET YOURSELF OR YOUR FAMILY DOWN: 2
9. THOUGHTS THAT YOU WOULD BE BETTER OFF DEAD, OR OF HURTING YOURSELF: 0
4. FEELING TIRED OR HAVING LITTLE ENERGY: SEVERAL DAYS
6. FEELING BAD ABOUT YOURSELF - OR THAT YOU ARE A FAILURE OR HAVE LET YOURSELF OR YOUR FAMILY DOWN: MORE THAN HALF THE DAYS
3. TROUBLE FALLING OR STAYING ASLEEP: SEVERAL DAYS
SUM OF ALL RESPONSES TO PHQ QUESTIONS 1-9: 7
SUM OF ALL RESPONSES TO PHQ QUESTIONS 1-9: 7
3. TROUBLE FALLING OR STAYING ASLEEP: 1

## 2024-01-29 DIAGNOSIS — M48.061 SPINAL STENOSIS OF LUMBAR REGION AT MULTIPLE LEVELS: ICD-10-CM

## 2024-01-29 RX ORDER — MORPHINE SULFATE 30 MG/1
30 TABLET, FILM COATED, EXTENDED RELEASE ORAL EVERY 12 HOURS
Qty: 60 TABLET | Refills: 0 | Status: SHIPPED | OUTPATIENT
Start: 2024-01-29 | End: 2024-02-28

## 2024-01-29 RX ORDER — DICLOFENAC SODIUM 75 MG/1
75 TABLET, DELAYED RELEASE ORAL 2 TIMES DAILY
Qty: 180 TABLET | Refills: 1 | Status: SHIPPED | OUTPATIENT
Start: 2024-01-29 | End: 2024-07-27

## 2024-01-29 NOTE — TELEPHONE ENCOUNTER
Patient requesting medication refill. Please approve or deny this request.    Rx requested:  Requested Prescriptions     Pending Prescriptions Disp Refills    diclofenac (VOLTAREN) 75 MG EC tablet 180 tablet 1     Sig: Take 1 tablet by mouth 2 times daily    morphine (MS CONTIN) 30 MG extended release tablet 60 tablet 0     Sig: Take 1 tablet by mouth in the morning and 1 tablet in the evening. Do all this for 30 days. Max Daily Amount: 60 mg.         Last Office Visit:   11/13/2023      Next Visit Date:  Future Appointments   Date Time Provider Department Center   1/30/2024  2:45 PM Arnaud Plunkett DO [facility]   2/13/2024  2:00 PM Arnaud Plunkett DO [facility] [place] [place]

## 2024-01-30 ENCOUNTER — OFFICE VISIT (OUTPATIENT)
Dept: FAMILY MEDICINE CLINIC | Age: 84
End: 2024-01-30
Payer: MEDICARE

## 2024-01-30 VITALS
WEIGHT: 209.8 LBS | SYSTOLIC BLOOD PRESSURE: 140 MMHG | TEMPERATURE: 98.9 F | BODY MASS INDEX: 28.42 KG/M2 | DIASTOLIC BLOOD PRESSURE: 82 MMHG | HEIGHT: 72 IN | OXYGEN SATURATION: 90 % | HEART RATE: 102 BPM

## 2024-01-30 DIAGNOSIS — R68.89 SENSATION OF FEELING HOT: Primary | ICD-10-CM

## 2024-01-30 DIAGNOSIS — Z23 NEEDS FLU SHOT: ICD-10-CM

## 2024-01-30 DIAGNOSIS — G89.29 OTHER CHRONIC PAIN: ICD-10-CM

## 2024-01-30 DIAGNOSIS — E27.1 ADDISON'S DISEASE (HCC): ICD-10-CM

## 2024-01-30 DIAGNOSIS — Z23 NEED FOR INFLUENZA VACCINATION: ICD-10-CM

## 2024-01-30 DIAGNOSIS — E29.1 MALE HYPOGONADISM: ICD-10-CM

## 2024-01-30 PROCEDURE — G0008 ADMIN INFLUENZA VIRUS VAC: HCPCS | Performed by: FAMILY MEDICINE

## 2024-01-30 PROCEDURE — 99214 OFFICE O/P EST MOD 30 MIN: CPT | Performed by: FAMILY MEDICINE

## 2024-01-30 PROCEDURE — 90674 CCIIV4 VAC NO PRSV 0.5 ML IM: CPT | Performed by: FAMILY MEDICINE

## 2024-01-30 PROCEDURE — 1123F ACP DISCUSS/DSCN MKR DOCD: CPT | Performed by: FAMILY MEDICINE

## 2024-01-30 RX ORDER — NYSTATIN 100000 U/G
CREAM TOPICAL
COMMUNITY
Start: 2023-11-21 | End: 2024-01-30 | Stop reason: SDUPTHER

## 2024-01-30 RX ORDER — TESTOSTERONE CYPIONATE 1000 MG/10ML
25 INJECTION, SOLUTION INTRAMUSCULAR WEEKLY
COMMUNITY
Start: 2023-12-22

## 2024-01-30 RX ORDER — NYSTATIN 100000 U/G
CREAM TOPICAL
Qty: 30 G | Refills: 3 | Status: SHIPPED | OUTPATIENT
Start: 2024-01-30

## 2024-01-30 RX ORDER — MAGNESIUM 64 MG (MAGNESIUM CHLORIDE) TABLET,DELAYED RELEASE
1 NIGHTLY
COMMUNITY
Start: 2024-01-24

## 2024-01-30 RX ORDER — SYRINGE WITH NEEDLE, 1 ML 25GX5/8"
SYRINGE, EMPTY DISPOSABLE MISCELLANEOUS
COMMUNITY
Start: 2023-12-22

## 2024-01-30 NOTE — PROGRESS NOTES
Subjective  Dom Grigsby 1940 is a 83 y.o. male who presents today with:  Chief Complaint   Patient presents with    Hot Flashes     C/O hot flashes for the past 8 months. He is often breaking out in a sweat when everyone else is cold. Reports previous medications were made in an attempt to alleviate the sweating with minimal effectiveness.       Other     S/P lumbar laminectomy of L3-L4, L4-L5, L5-S1 with a L4-L5 disc excision on 10/2/23 by Dr. Martinez Agosto. 16 weeks post op on 1/22/24. Currently involved with physical therapy twice weekly. Patient states he feels that the surgery went very well. Pain is currently well managed.     Requesting flu shot today.          HPI  I have reviewed HPI and agree with above.     Review of Systems   All other systems reviewed and are negative.      History reviewed. No pertinent past medical history.  History reviewed. No pertinent surgical history.  Social History     Socioeconomic History    Marital status:      Spouse name: Not on file    Number of children: Not on file    Years of education: Not on file    Highest education level: Not on file   Occupational History    Not on file   Tobacco Use    Smoking status: Never    Smokeless tobacco: Never   Vaping Use    Vaping Use: Never used   Substance and Sexual Activity    Alcohol use: Never    Drug use: Never    Sexual activity: Not on file   Other Topics Concern    Not on file   Social History Narrative    Not on file     Social Determinants of Health     Financial Resource Strain: Low Risk  (8/14/2023)    Overall Financial Resource Strain (CARDIA)     Difficulty of Paying Living Expenses: Not hard at all   Food Insecurity: Not on file (8/14/2023)   Transportation Needs: Unknown (8/14/2023)    PRAPARE - Transportation     Lack of Transportation (Medical): Not on file     Lack of Transportation (Non-Medical): No   Physical Activity: Not on file   Stress: Not on file   Social Connections: Not on file

## 2024-02-05 RX ORDER — FUROSEMIDE 40 MG/1
40 TABLET ORAL DAILY
Qty: 60 TABLET | Refills: 2 | Status: SHIPPED | OUTPATIENT
Start: 2024-02-05

## 2024-02-05 NOTE — TELEPHONE ENCOUNTER
Pt requesting medication refill. Please approve or deny this request.    Rx requested:  Requested Prescriptions     Pending Prescriptions Disp Refills    furosemide (LASIX) 40 MG tablet 60 tablet          Last Office Visit:   1/30/2024      Next Visit Date:  Future Appointments   Date Time Provider Department Center   4/30/2024  4:30 PM Arnaud Plunkett DO [facility] [place]

## 2024-02-12 RX ORDER — GUANFACINE 2 MG/1
1 TABLET ORAL NIGHTLY
Qty: 30 TABLET | Refills: 0 | Status: SHIPPED | OUTPATIENT
Start: 2024-02-12

## 2024-02-12 NOTE — TELEPHONE ENCOUNTER
Future Appointments    Encounter Information   Provider Department Appt Notes   4/30/2024 Arnaud Plunkett DO [facility] [place]     Past Visits    Date Provider Specialty Visit Type Primary Dx   01/30/2024 Arnaud Plunkett,  Family Medicine Office Visit Sensation of feeling hot

## 2024-02-15 ENCOUNTER — TELEPHONE (OUTPATIENT)
Dept: FAMILY MEDICINE CLINIC | Age: 84
End: 2024-02-15

## 2024-02-15 NOTE — TELEPHONE ENCOUNTER
Mariela from [employer] called checking on status of the Detailed Product Description form. It is one page that needs signed by provider.  She said she has faxed it twice since 2/9. If there are any questions, please call her at [phone].    Thank you.

## 2024-02-19 ENCOUNTER — TELEPHONE (OUTPATIENT)
Dept: FAMILY MEDICINE CLINIC | Age: 84
End: 2024-02-19

## 2024-02-19 DIAGNOSIS — E29.1 MALE HYPOGONADISM: Primary | ICD-10-CM

## 2024-02-19 RX ORDER — TESTOSTERONE CYPIONATE 1000 MG/10ML
25 INJECTION, SOLUTION INTRAMUSCULAR WEEKLY
Qty: 3 ML | Refills: 0 | Status: SHIPPED | OUTPATIENT
Start: 2024-02-19 | End: 2024-05-19

## 2024-02-19 RX ORDER — DOXYCYCLINE HYCLATE 100 MG/1
100 CAPSULE ORAL 2 TIMES DAILY
Qty: 20 CAPSULE | Refills: 0 | Status: SHIPPED | OUTPATIENT
Start: 2024-02-19

## 2024-02-19 NOTE — TELEPHONE ENCOUNTER
Pt wife is asking if anything can get called in for Pt for Cellulitis. I did advice to make an appt for tomorrow. Pt is scheduled for tomorrow at 3pm, but rather just have something called in if possible. Pt is also requesting a refill on testosterone.     MEDICATION REFILL REQUEST     Rx Requested    Requested Prescriptions     Pending Prescriptions Disp Refills    testosterone cypionate (DEPOTESTOTERONE CYPIONATE) 100 MG/ML injection       Sig: Inject 0.25 mLs into the muscle once a week. Max Daily Amount: 25 mg         Patient's Last Office Visit   1/30/2024      Patient's Next Office Visit   Future Appointments   Date Time Provider Department Center   2/20/2024  3:00 PM Arnaud Plnukett,  [facility]   4/30/2024  4:30 PM Arnaud Plunkett DO [facility]         Other comments   /

## 2024-02-19 NOTE — TELEPHONE ENCOUNTER
Closed  PA Detail   Prior Authorization not required for patient/medication   Case ID: [id number]      Payer: [employer] Patient's Payer    [phone]   [facility] predicted that this prior auth will not be required.

## 2024-02-26 RX ORDER — GUANFACINE 2 MG/1
1 TABLET ORAL NIGHTLY
Qty: 30 TABLET | Refills: 0 | Status: SHIPPED | OUTPATIENT
Start: 2024-02-26

## 2024-02-26 NOTE — TELEPHONE ENCOUNTER
Future Appointments    Encounter Information   Provider Department Appt Notes   4/30/2024 Arnaud Plunkett DO [facility] Primary and Specialty [facility] [place]     Past Visits    Date Provider Specialty Visit Type Primary Dx   01/30/2024 Arnaud Plunkett,  Family Medicine Office Visit Sensation of feeling hot

## 2024-02-28 DIAGNOSIS — M48.061 SPINAL STENOSIS OF LUMBAR REGION AT MULTIPLE LEVELS: ICD-10-CM

## 2024-02-28 RX ORDER — ALLOPURINOL 300 MG/1
300 TABLET ORAL DAILY
Qty: 90 TABLET | Refills: 3 | Status: SHIPPED | OUTPATIENT
Start: 2024-02-28

## 2024-02-28 RX ORDER — PREGABALIN 50 MG/1
50 CAPSULE ORAL 3 TIMES DAILY
Qty: 270 CAPSULE | Refills: 0 | Status: SHIPPED | OUTPATIENT
Start: 2024-02-28 | End: 2024-05-28

## 2024-02-28 RX ORDER — MORPHINE SULFATE 30 MG/1
30 TABLET, FILM COATED, EXTENDED RELEASE ORAL EVERY 12 HOURS
Qty: 60 TABLET | Refills: 0 | Status: SHIPPED | OUTPATIENT
Start: 2024-02-28 | End: 2024-03-29

## 2024-02-28 NOTE — TELEPHONE ENCOUNTER
MEDICATION REFILL REQUEST     Rx Requested    Requested Prescriptions     Pending Prescriptions Disp Refills    pregabalin (LYRICA) 50 MG capsule 270 capsule 0     Sig: Take 1 capsule by mouth 3 times daily for 90 days. Max Daily Amount: 150 mg    allopurinol (ZYLOPRIM) 300 MG tablet 90 tablet 3     Sig: Take 1 tablet by mouth daily    morphine (MS CONTIN) 30 MG extended release tablet 60 tablet 0     Sig: Take 1 tablet by mouth in the morning and 1 tablet in the evening. Do all this for 30 days. Max Daily Amount: 60 mg.         Patient's Last Office Visit   1/30/2024      Patient's Next Office Visit   Future Appointments   Date Time Provider Department Center   4/30/2024  4:30 PM Arnaud Plunkett, DO [facility]         Other comments   /

## 2024-03-04 ENCOUNTER — TELEPHONE (OUTPATIENT)
Dept: FAMILY MEDICINE CLINIC | Age: 84
End: 2024-03-04

## 2024-03-04 DIAGNOSIS — E29.1 MALE HYPOGONADISM: Primary | ICD-10-CM

## 2024-03-04 NOTE — TELEPHONE ENCOUNTER
Patient called asking if we received his testosterone lab results. The one from 2/23 is scanned into media. Patient is faxing another report he received to our office today.    Thank you.

## 2024-03-05 ENCOUNTER — TELEPHONE (OUTPATIENT)
Dept: FAMILY MEDICINE CLINIC | Age: 84
End: 2024-03-05

## 2024-03-05 NOTE — TELEPHONE ENCOUNTER
Patient got his labs 2 days after his shot. He is concerned about his testosterone level.    He also wants to know if the other labs look ok.    Thank you.

## 2024-03-11 RX ORDER — GUANFACINE 2 MG/1
1 TABLET ORAL NIGHTLY
Qty: 30 TABLET | Refills: 0 | Status: SHIPPED | OUTPATIENT
Start: 2024-03-11

## 2024-03-15 ENCOUNTER — TELEPHONE (OUTPATIENT)
Dept: FAMILY MEDICINE CLINIC | Age: 84
End: 2024-03-15

## 2024-03-15 NOTE — TELEPHONE ENCOUNTER
They were received, didn't know if you wanted to take a look at them. They are under the media tab in his chart.

## 2024-03-15 NOTE — TELEPHONE ENCOUNTER
Patient called and is asking that if the labs he got drawn at [facility] have been received and is asking for results for xray also done at [facility].

## 2024-03-25 ENCOUNTER — TELEPHONE (OUTPATIENT)
Dept: FAMILY MEDICINE CLINIC | Age: 84
End: 2024-03-25

## 2024-03-25 DIAGNOSIS — M48.061 SPINAL STENOSIS OF LUMBAR REGION AT MULTIPLE LEVELS: ICD-10-CM

## 2024-03-25 RX ORDER — POTASSIUM CHLORIDE 750 MG/1
10 CAPSULE, EXTENDED RELEASE ORAL 2 TIMES DAILY
Qty: 180 CAPSULE | Refills: 1 | Status: SHIPPED | OUTPATIENT
Start: 2024-03-25

## 2024-03-25 RX ORDER — MORPHINE SULFATE 30 MG/1
30 TABLET, FILM COATED, EXTENDED RELEASE ORAL EVERY 12 HOURS
Qty: 60 TABLET | Refills: 0 | Status: SHIPPED | OUTPATIENT
Start: 2024-03-25 | End: 2024-04-24

## 2024-03-25 NOTE — TELEPHONE ENCOUNTER
Rx request   Requested Prescriptions     Pending Prescriptions Disp Refills    potassium chloride (MICRO-K) 10 MEQ extended release capsule 180 capsule 1     Sig: Take 1 capsule by mouth 2 times daily    morphine (MS CONTIN) 30 MG extended release tablet 60 tablet 0     Sig: Take 1 tablet by mouth in the morning and 1 tablet in the evening. Do all this for 30 days. Max Daily Amount: 60 mg.     LOV 1/30/2024  Next Visit Date:  Future Appointments   Date Time Provider Department Center   4/30/2024  4:30 PM Arnaud Plunkett, DO [facility] [place]

## 2024-03-25 NOTE — TELEPHONE ENCOUNTER
Prior Authorization History  morphine (MS CONTIN) 30 MG extended release tablet     History  PA Detail   View all authorizations for this medication   Closed   3/25/2024 12:56 PM  Case ID: [id number] Close reason: Prior Authorization not required for patient/medication  Note from payer: [facility] has predicted that this prior auth will not be required.   Payer: Auto Search Patient's Payer    [phone]      Waiting for Payer Response   3/25/2024 12:56 PM  Sending user: Arnaud Plunkett DO   Payer: Auto Search Patient's Payer    [phone]

## 2024-03-26 DIAGNOSIS — M48.061 SPINAL STENOSIS OF LUMBAR REGION AT MULTIPLE LEVELS: ICD-10-CM

## 2024-03-26 RX ORDER — MORPHINE SULFATE 30 MG/1
30 TABLET, FILM COATED, EXTENDED RELEASE ORAL EVERY 12 HOURS
Qty: 60 TABLET | Refills: 0 | OUTPATIENT
Start: 2024-03-26 | End: 2024-04-25

## 2024-03-29 RX ORDER — CYCLOBENZAPRINE HCL 10 MG
TABLET ORAL
Qty: 30 TABLET | Refills: 3 | Status: SHIPPED | OUTPATIENT
Start: 2024-03-29

## 2024-03-29 NOTE — TELEPHONE ENCOUNTER
Please approve or deny request. Thank you!    Rx requested:  Requested Prescriptions     Pending Prescriptions Disp Refills    cyclobenzaprine (FLEXERIL) 10 MG tablet 30 tablet 3     Sig: TAKE 1 TABLET BY MOUTH THREE TIMES DAILY AS NEEDED for spasm         Last Office Visit:   1/30/2024      Next Visit Date:  Future Appointments   Date Time Provider Department Center   4/30/2024  4:30 PM Arnaud Plunkett DO [facility] [place]

## 2024-04-04 ENCOUNTER — OFFICE VISIT (OUTPATIENT)
Dept: NEUROSURGERY | Facility: CLINIC | Age: 84
End: 2024-04-04
Payer: MEDICARE

## 2024-04-04 VITALS
TEMPERATURE: 98 F | DIASTOLIC BLOOD PRESSURE: 90 MMHG | HEART RATE: 62 BPM | BODY MASS INDEX: 29.8 KG/M2 | HEIGHT: 72 IN | SYSTOLIC BLOOD PRESSURE: 158 MMHG | WEIGHT: 220 LBS

## 2024-04-04 DIAGNOSIS — M48.061 SPINAL STENOSIS OF LUMBAR REGION AT MULTIPLE LEVELS: Primary | ICD-10-CM

## 2024-04-04 PROCEDURE — 99214 OFFICE O/P EST MOD 30 MIN: CPT | Performed by: NEUROLOGICAL SURGERY

## 2024-04-04 PROCEDURE — 1036F TOBACCO NON-USER: CPT | Performed by: NEUROLOGICAL SURGERY

## 2024-04-04 PROCEDURE — 1125F AMNT PAIN NOTED PAIN PRSNT: CPT | Performed by: NEUROLOGICAL SURGERY

## 2024-04-04 RX ORDER — GUANFACINE 2 MG/1
2 TABLET ORAL NIGHTLY
COMMUNITY
Start: 2024-03-11

## 2024-04-04 RX ORDER — TESTOSTERONE CYPIONATE 1000 MG/10ML
25 INJECTION, SOLUTION INTRAMUSCULAR
COMMUNITY
Start: 2023-12-22

## 2024-04-04 RX ORDER — MORPHINE SULFATE 30 MG/1
TABLET, FILM COATED, EXTENDED RELEASE ORAL
COMMUNITY
Start: 2024-03-30

## 2024-04-04 RX ORDER — NYSTATIN 100000 U/G
CREAM TOPICAL
COMMUNITY
Start: 2024-03-01

## 2024-04-04 RX ORDER — SYRINGE WITH NEEDLE, 1 ML 25GX5/8"
SYRINGE, EMPTY DISPOSABLE MISCELLANEOUS
COMMUNITY
Start: 2023-12-22

## 2024-04-04 ASSESSMENT — PAIN SCALES - GENERAL: PAINLEVEL: 6

## 2024-04-04 ASSESSMENT — ENCOUNTER SYMPTOMS: OCCASIONAL FEELINGS OF UNSTEADINESS: 1

## 2024-04-04 NOTE — PROGRESS NOTES
[facility] [place]  Department of Neurological Surgery  Established Patient Visit    History of Present Illness  Danny Bah is a 83 y.o. year old male who presents to the spine clinic in follow up with his wife for his 6-month follow-up visit.  We did a decompressive lumbar laminectomy at L3-4 L4-5 and down to L5-S1.  Since I last saw him he has been able to get a scooter.  And I think that will help that even more when they get into a different living situation.  Right now he is still having pain in the right buttock and thigh area that wraps into his right leg.  It is similar but not nearly as intense as the pain that we did the surgery for.  I think he is going to be left with a bit of chronic pain.  He understands this and is learning how to live with it.  We went over all of his do's and don'ts and I asked if there was anything I could help him with and he said no and so we are going to have him follow-up with us on an as-needed basis.    Patient's BMI is Body mass index is 29.84 kg/m².    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  No past surgical history on file.  Social History     Tobacco Use    Smoking status: Never    Smokeless tobacco: Never   Substance Use Topics    Alcohol use: Not Currently     Comment: Rarely     family history  "includes Hypertension in his mother; Lung cancer in his father.    Current Outpatient Medications:     albuterol 2.5 mg /3 mL (0.083 %) nebulizer solution, Take 3 mL (2.5 mg) by nebulization every 4 hours if needed for wheezing., Disp: , Rfl:     albuterol 90 mcg/actuation inhaler, Inhale 2 puffs every 4 hours if needed., Disp: , Rfl:     albuterol-budesonide (Airsupra) 90-80 mcg/actuation inhaler, Inhale., Disp: , Rfl:     allopurinol (Zyloprim) 300 mg tablet, Take 1 tablet (300 mg) by mouth once daily., Disp: , Rfl:     BD Luer-Ad Syringe 3 mL 22 gauge x 1\" syringe, USE AS DIRECTED EVERY WEEK, Disp: , Rfl:     calcium carbonate-vitamin D3 500 mg-5 mcg (200 unit) tablet, Take 1 tablet by mouth once daily., Disp: , Rfl:     cholecalciferol (Vitamin D-3) 50 MCG (2000 UT) tablet, Take 1 tablet (2,000 Units) by mouth once daily., Disp: , Rfl:     cyclobenzaprine (Flexeril) 10 mg tablet, Take 1 tablet (10 mg) by mouth 3 times a day as needed for muscle spasms., Disp: , Rfl:     diclofenac (Voltaren) 75 mg EC tablet, Take 1 tablet (75 mg) by mouth twice a day., Disp: , Rfl:     DULoxetine (Cymbalta) 60 mg DR capsule, Take 1 capsule (60 mg) by mouth once daily. Do not crush or chew., Disp: , Rfl:     furosemide (Lasix) 40 mg tablet, Take by mouth., Disp: , Rfl:     guanFACINE (Tenex) 2 mg tablet, Take 1 tablet (2 mg) by mouth once daily at bedtime., Disp: , Rfl:     hydrocortisone (Cortef) 10 mg tablet, Take by mouth once daily., Disp: , Rfl:     hydrOXYzine HCL (Atarax) 25 mg tablet, Take 1 tablet (25 mg) by mouth every 6 hours if needed for anxiety., Disp: , Rfl:     iron polysaccharides (Nu-Iron,Niferex) 150 mg iron capsule, Take 1 capsule (150 mg) by mouth once daily., Disp: , Rfl:     levothyroxine (Synthroid, Levoxyl) 150 mcg tablet, Take 1 tablet (150 mcg) by mouth once daily in the morning. Take before meals., Disp: , Rfl:     Mag 64 64 mg EC tablet, Take 1 tablet (64 mg) by mouth once daily at bedtime., Disp: , " Rfl:     morphine CR (MS Contin) 30 mg 12 hr tablet, Take 1 tablet by mouth in the morning and 1 tablet in the evening. Do all this for 30 days. Max Daily Amount 60 mg., Disp: , Rfl:     multivitamin/iron/folic acid (CENTRUM ORAL), Take by mouth once daily., Disp: , Rfl:     naloxone (Narcan) 1 mg/mL injection, Infuse 0.2 mL (0.2 mg) into a venous catheter every 5 minutes if needed for respiratory depression. May repeat every 2-3 minutes as needed until medical assistance available., Disp: 4 mL, Rfl: prn    nystatin (Mycostatin) 100,000 unit/mL suspension, Take 5 mL (500,000 Units) by mouth 4 times a day.  Retain in mouth as long as possible prior to swallowing., Disp: , Rfl:     nystatin (Mycostatin) cream, APPLY TO THE AFFECTED AREA(S) TWICE DAILY AS DIRECTED, Disp: , Rfl:     omeprazole OTC (PriLOSEC OTC) 20 mg EC tablet, Take 1 tablet (20 mg) by mouth once daily in the morning. Take before meals., Disp: , Rfl:     oxygen (O2) gas therapy, Inhale. Inhale into the lungs 2 LPM at night due to restrictive lung disease, Disp: , Rfl:     polyethylene glycol (Glycolax, Miralax) packet, Take 17 g by mouth once daily as needed (constipation)., Disp: , Rfl:     potassium chloride ER (Micro-K) 10 mEq ER capsule, Take 1 capsule (10 mEq) by mouth 2 times a day. Do not crush or chew., Disp: , Rfl:     pregabalin (Lyrica) 50 mg capsule, Take 1 capsule (50 mg) by mouth 3 times a day., Disp: , Rfl:     psyllium (Metamucil) 3.4 gram packet, Take 1 packet by mouth once daily., Disp: , Rfl:     sennosides-docusate sodium (Vandana-Colace) 8.6-50 mg tablet, Take 1 tablet by mouth once daily., Disp: , Rfl:     tamsulosin (Flomax) 0.4 mg 24 hr capsule, Take 1 capsule (0.4 mg) by mouth 2 times a day., Disp: , Rfl:     testosterone cypionate (Depo-Testosterone) 100 mg/mL injection, Inject 0.25 mL (25 mg) into the muscle every 7 days., Disp: , Rfl:     metoclopramide (Reglan) 5 mg tablet, Take 1 tablet (5 mg) by mouth 4 times a day., Disp: ,  Rfl:     nitrofurantoin, macrocrystal-monohydrate, (Macrobid) 100 mg capsule, Take 1 capsule (100 mg) by mouth twice a day., Disp: , Rfl:     simvastatin (FLOLIPID ORAL), Take 0.4 mg by mouth once daily., Disp: , Rfl:   Allergies   Allergen Reactions    Penicillins Rash         Results:  I personally reviewed and interpreted the imaging results which included plain x-rays of his lumbar spine.  There is a lot of spondylosis going on in this region.  As long as he continues to stay at his present level of discomfort I think we will be available if something changes.    Assessment and Plan:      Danny Bah is a 83 y.o. year old male who presents to the spine clinic in follow up with his wife for follow-up and I think he is in a holding pattern now.  We are available if something changes.      I have reviewed all prior documentation and reviewed the electronic medical record since admission. I have personally have reviewed all advanced imaging not just the reports and used my interpretation as documented as the relevant findings. I have reviewed the risks and benefits of all treatment recommendations listed in this note with the patient and family. I spent a total of 30 minutes in service to this patient's care during this date of service.      The above clinical summary has been dictated with voice recognition software. It has not been proofread for grammatical errors, typographical mistakes, or other semantic inconsistencies.    Thank you for visiting our office today. It was our pleasure to take part in your healthcare.     Do not hesitate to call with any questions regarding your plan of care after leaving. My office can be reached at [phone] M-F 8am-4pm.     To clinicians, thank you very much for this kind referral. It is a privilege to partner with you in the care of your patients. My office would be delighted to assist you with any further consultations or with questions regarding the plan of care  outlined. Do not hesitate to call the office or contact me directly.     Sincerely,    Geronimo Vasquez MD, FAANS, FACS  Board Certified Neurological Surgeon  , Department of Neurological Surgery  [facility]    [facility]  [address]  [facility] [address]  [place] [id number]    [facility]  [address]  [address]  [place] [address]    Phone: [phone]  Fax: [phone]

## 2024-04-09 RX ORDER — GUANFACINE 2 MG/1
1 TABLET ORAL NIGHTLY
Qty: 30 TABLET | Refills: 0 | Status: SHIPPED | OUTPATIENT
Start: 2024-04-09

## 2024-04-22 RX ORDER — HYDROXYZINE HYDROCHLORIDE 25 MG/1
TABLET, FILM COATED ORAL
Qty: 90 TABLET | Refills: 3 | Status: SHIPPED | OUTPATIENT
Start: 2024-04-22

## 2024-04-22 RX ORDER — CYCLOBENZAPRINE HCL 10 MG
TABLET ORAL
Qty: 30 TABLET | Refills: 3 | Status: SHIPPED | OUTPATIENT
Start: 2024-04-22

## 2024-04-22 NOTE — TELEPHONE ENCOUNTER
Future Appointments    Encounter Information   Provider Department Appt Notes   4/30/2024 Arnaud Plunkett DO [facility] Primary and Specialty Care [place]     Past Visits    Date Provider Specialty Visit Type Primary Dx   01/30/2024 Arnaud Plunkett,  Family Medicine Office Visit Sensation of feeling hot

## 2024-04-22 NOTE — TELEPHONE ENCOUNTER
Future Appointments    Encounter Information   Provider Department Appt Notes   4/30/2024 Arnaud Plunkett DO [facility] Primary and Specialty Care [place]     Past Visits    Date Provider Specialty Visit Type Primary Dx   01/30/2024 Arnaud Plunkett,  Family Medicine Office Visit Sensation of feeling hot

## 2024-04-29 RX ORDER — ALBUTEROL SULFATE 2.5 MG/3ML
SOLUTION RESPIRATORY (INHALATION)
Qty: 120 EACH | Refills: 5 | Status: SHIPPED | OUTPATIENT
Start: 2024-04-29

## 2024-04-29 NOTE — TELEPHONE ENCOUNTER
Patient requesting medication refill. Please approve or deny this request.    Rx requested:  Requested Prescriptions     Pending Prescriptions Disp Refills    albuterol (PROVENTIL) (2.5 MG/3ML) 0.083% nebulizer solution 120 each          Last Office Visit:   1/30/2024      Next Visit Date:  Future Appointments   Date Time Provider Department Center   4/30/2024  4:30 PM Arnaud Plunkett DO [facility] [place]

## 2024-04-30 ENCOUNTER — OFFICE VISIT (OUTPATIENT)
Dept: FAMILY MEDICINE CLINIC | Age: 84
End: 2024-04-30

## 2024-04-30 VITALS
HEIGHT: 72 IN | SYSTOLIC BLOOD PRESSURE: 138 MMHG | TEMPERATURE: 98.9 F | DIASTOLIC BLOOD PRESSURE: 84 MMHG | HEART RATE: 90 BPM | BODY MASS INDEX: 28.31 KG/M2 | OXYGEN SATURATION: 94 % | WEIGHT: 209 LBS

## 2024-04-30 DIAGNOSIS — M48.061 SPINAL STENOSIS OF LUMBAR REGION AT MULTIPLE LEVELS: ICD-10-CM

## 2024-04-30 DIAGNOSIS — L82.1 SEBORRHEIC KERATOSIS: ICD-10-CM

## 2024-04-30 DIAGNOSIS — L91.8 SKIN TAGS, MULTIPLE ACQUIRED: ICD-10-CM

## 2024-04-30 DIAGNOSIS — E29.1 MALE HYPOGONADISM: Primary | ICD-10-CM

## 2024-05-02 DIAGNOSIS — M48.061 SPINAL STENOSIS OF LUMBAR REGION AT MULTIPLE LEVELS: ICD-10-CM

## 2024-05-02 RX ORDER — MORPHINE SULFATE 30 MG/1
30 TABLET, FILM COATED, EXTENDED RELEASE ORAL EVERY 12 HOURS
Qty: 60 TABLET | Refills: 0 | Status: SHIPPED | OUTPATIENT
Start: 2024-05-02 | End: 2024-06-01

## 2024-05-02 NOTE — PROGRESS NOTES
Subjective  Dom Grigsby 1940 is a 83 y.o. male who presents today with:  Chief Complaint   Patient presents with    Skin Problem     Presents for removal of skin lesions around his neck and chest.        Skin Problem      I have reviewed HPI and agree with above. 3 month check for chronic pain.  Controlled substances monitoring: possible medication side effects, risk of tolerance and/or dependence, and alternative treatments discussed and no signs of potential drug abuse or diversion identified. PDMP reviewed    Review of Systems   All other systems reviewed and are negative.      History reviewed. No pertinent past medical history.  History reviewed. No pertinent surgical history.  Social History     Socioeconomic History    Marital status:      Spouse name: Not on file    Number of children: Not on file    Years of education: Not on file    Highest education level: Not on file   Occupational History    Not on file   Tobacco Use    Smoking status: Never    Smokeless tobacco: Never   Vaping Use    Vaping Use: Never used   Substance and Sexual Activity    Alcohol use: Never    Drug use: Never    Sexual activity: Not on file   Other Topics Concern    Not on file   Social History Narrative    Not on file     Social Determinants of Health     Financial Resource Strain: Low Risk  (8/14/2023)    Overall Financial Resource Strain (CARDIA)     Difficulty of Paying Living Expenses: Not hard at all   Food Insecurity: Not on file (8/14/2023)   Transportation Needs: Unknown (8/14/2023)    PRAPARE - Transportation     Lack of Transportation (Medical): Not on file     Lack of Transportation (Non-Medical): No   Physical Activity: Not on file   Stress: Not on file   Social Connections: Not on file   Intimate Partner Violence: Not on file   Housing Stability: Unknown (8/14/2023)    Housing Stability Vital Sign     Unable to Pay for Housing in the Last Year: Not on file     Number of Places Lived in the Last Year:

## 2024-05-02 NOTE — TELEPHONE ENCOUNTER
Patient requesting medication refill. Please approve or deny this request.    Rx requested:  Requested Prescriptions     Pending Prescriptions Disp Refills    morphine (MS CONTIN) 30 MG extended release tablet 60 tablet 0     Sig: Take 1 tablet by mouth in the morning and 1 tablet in the evening. Do all this for 30 days. Max Daily Amount: 60 mg.         Last Office Visit:   4/30/2024      Next Visit Date:  Future Appointments   Date Time Provider Department Center   7/30/2024  4:15 PM Arnaud Plunkett,  [facility] [place]

## 2024-05-13 RX ORDER — GUANFACINE 2 MG/1
1 TABLET ORAL NIGHTLY
Qty: 30 TABLET | Refills: 0 | Status: SHIPPED | OUTPATIENT
Start: 2024-05-13

## 2024-05-13 NOTE — TELEPHONE ENCOUNTER
Please approve or deny request. Thank you!    Rx requested:  Requested Prescriptions     Pending Prescriptions Disp Refills    guanFACINE HCl 2 MG TABS 30 tablet 0     Sig: Take 1 tablet by mouth nightly         Last Office Visit:   4/30/2024      Next Visit Date:  Future Appointments   Date Time Provider Department Center   7/30/2024  4:15 PM Arnaud Plunkett DO [facility] [place]

## 2024-05-28 DIAGNOSIS — M48.061 SPINAL STENOSIS OF LUMBAR REGION AT MULTIPLE LEVELS: ICD-10-CM

## 2024-05-28 RX ORDER — MORPHINE SULFATE 30 MG/1
30 TABLET, FILM COATED, EXTENDED RELEASE ORAL EVERY 12 HOURS
Qty: 60 TABLET | Refills: 0 | Status: SHIPPED | OUTPATIENT
Start: 2024-05-28 | End: 2024-06-27

## 2024-05-28 RX ORDER — PREGABALIN 50 MG/1
50 CAPSULE ORAL 3 TIMES DAILY
Qty: 270 CAPSULE | Refills: 0 | Status: SHIPPED | OUTPATIENT
Start: 2024-05-28 | End: 2024-08-26

## 2024-05-28 NOTE — TELEPHONE ENCOUNTER
Future Appointments    Encounter Information   Provider Department Appt Notes   7/30/2024 Arnaud Plunkett DO [facility] Primary and Specialty Care 3 month follow up     Past Visits    Date Provider Specialty Visit Type Primary Dx   04/30/2024 Arnaud Plunkett DO Family Medicine Office Visit Male hypogonadism

## 2024-06-10 RX ORDER — GUANFACINE 2 MG/1
1 TABLET ORAL NIGHTLY
Qty: 30 TABLET | Refills: 0 | Status: SHIPPED | OUTPATIENT
Start: 2024-06-10

## 2024-06-26 DIAGNOSIS — M48.061 SPINAL STENOSIS OF LUMBAR REGION AT MULTIPLE LEVELS: ICD-10-CM

## 2024-06-26 RX ORDER — MORPHINE SULFATE 30 MG/1
30 TABLET, FILM COATED, EXTENDED RELEASE ORAL EVERY 12 HOURS
Qty: 60 TABLET | Refills: 0 | Status: SHIPPED | OUTPATIENT
Start: 2024-06-26 | End: 2024-07-26

## 2024-06-26 NOTE — TELEPHONE ENCOUNTER
Future Appointments    Encounter Information   Provider Department Appt Notes   7/30/2024 Arnaud Plunkett DO [facility] Primary and Specialty Care 3 month follow up     Past Visits    Date Provider Specialty Visit Type Primary Dx   04/30/2024 Arnaud Plunkett DO Family Medicine Office Visit Male hypogonadism

## 2024-06-29 DIAGNOSIS — E29.1 MALE HYPOGONADISM: ICD-10-CM

## 2024-07-01 RX ORDER — TESTOSTERONE CYPIONATE 1000 MG/10ML
25 INJECTION, SOLUTION INTRAMUSCULAR WEEKLY
Qty: 3 ML | Refills: 0 | Status: SHIPPED | OUTPATIENT
Start: 2024-07-01 | End: 2024-09-29

## 2024-07-15 ENCOUNTER — TELEPHONE (OUTPATIENT)
Dept: NEUROSURGERY | Facility: CLINIC | Age: 84
End: 2024-07-15
Payer: MEDICARE

## 2024-07-15 RX ORDER — GUANFACINE 2 MG/1
1 TABLET ORAL NIGHTLY
Qty: 30 TABLET | Refills: 0 | Status: SHIPPED | OUTPATIENT
Start: 2024-07-15

## 2024-07-26 ENCOUNTER — TELEPHONE (OUTPATIENT)
Dept: FAMILY MEDICINE CLINIC | Age: 84
End: 2024-07-26

## 2024-07-26 NOTE — TELEPHONE ENCOUNTER
*Patient is saying he never picked up refills and now the pharmacy says it is to late  Next appt 7/30/24  Patient was advised Dr. Plunkett is now out of the office      MEDICATION REFILL REQUEST     Rx Requested    Requested Prescriptions     Pending Prescriptions Disp Refills    nystatin (MYCOSTATIN) 371092 UNIT/GM cream 30 g 3     Sig: APPLY TO THE AFFECTED AREA(S) TWICE DAILY AS DIRECTED    nystatin (MYCOSTATIN) 433632 UNIT/ML suspension 240 mL 1         Patient's Last Office Visit   4/30/2024      Patient's Next Office Visit   Future Appointments   Date Time Provider Department Center   7/30/2024  4:15 PM Arnaud Plunkett, DO [facility]         Other comments   Patient requesting refills    [facility] confirmed pharmacy    Patient phone [phone]

## 2024-07-26 NOTE — TELEPHONE ENCOUNTER
Patient requesting referral to PT  He would like to go through [facility]    Patient next appt 7/30/24 and will discuss further at appt. PT was terminated due to insurance.

## 2024-07-29 DIAGNOSIS — M48.061 SPINAL STENOSIS OF LUMBAR REGION AT MULTIPLE LEVELS: Primary | ICD-10-CM

## 2024-07-29 DIAGNOSIS — R26.81 UNSTEADY GAIT: ICD-10-CM

## 2024-07-29 RX ORDER — NYSTATIN 100000 U/G
CREAM TOPICAL
Qty: 30 G | Refills: 3 | Status: SHIPPED | OUTPATIENT
Start: 2024-07-29

## 2024-07-29 RX ORDER — FUROSEMIDE 40 MG/1
40 TABLET ORAL DAILY
Qty: 60 TABLET | Refills: 2 | Status: SHIPPED | OUTPATIENT
Start: 2024-07-29

## 2024-07-29 RX ORDER — DICLOFENAC SODIUM 75 MG/1
75 TABLET, DELAYED RELEASE ORAL 2 TIMES DAILY
Qty: 180 TABLET | Refills: 1 | Status: SHIPPED | OUTPATIENT
Start: 2024-07-29 | End: 2025-01-25

## 2024-07-29 NOTE — TELEPHONE ENCOUNTER
Future Appointments    Encounter Information   Provider Department Appt Notes   7/30/2024 Arnaud Plunkett,  [facility] Primary and Specialty Care 3 month follow up     Past Visits    Date Provider Specialty Visit Type Primary Dx   04/30/2024 Arnaud Plunkett,  Family Medicine Office Visit Male hypogonadism   01/30/2024 Arnaud Plunkett DO Family Medicine Office Visit Sensation of feeling hot   11/13/2023 Arnaud Plunkett DO Family Medicine Telemedicine COVID-19 virus infection   08/14/2023 Arnaud Plunkett,  Family Medicine Office Visit Spinal stenosis of lumbar region at multiple levels

## 2024-07-30 ENCOUNTER — OFFICE VISIT (OUTPATIENT)
Dept: FAMILY MEDICINE CLINIC | Age: 84
End: 2024-07-30
Payer: MEDICARE

## 2024-07-30 VITALS
HEIGHT: 72 IN | OXYGEN SATURATION: 93 % | HEART RATE: 82 BPM | TEMPERATURE: 98.8 F | DIASTOLIC BLOOD PRESSURE: 80 MMHG | WEIGHT: 209 LBS | BODY MASS INDEX: 28.31 KG/M2 | SYSTOLIC BLOOD PRESSURE: 142 MMHG

## 2024-07-30 DIAGNOSIS — M48.061 SPINAL STENOSIS OF LUMBAR REGION AT MULTIPLE LEVELS: Primary | ICD-10-CM

## 2024-07-30 DIAGNOSIS — Z79.899 HIGH RISK MEDICATION USE: ICD-10-CM

## 2024-07-30 DIAGNOSIS — E29.1 MALE HYPOGONADISM: ICD-10-CM

## 2024-07-30 DIAGNOSIS — M1A.09X0 CHRONIC GOUT OF MULTIPLE SITES, UNSPECIFIED CAUSE: ICD-10-CM

## 2024-07-30 PROCEDURE — 1123F ACP DISCUSS/DSCN MKR DOCD: CPT | Performed by: FAMILY MEDICINE

## 2024-07-30 PROCEDURE — 99214 OFFICE O/P EST MOD 30 MIN: CPT | Performed by: FAMILY MEDICINE

## 2024-07-30 RX ORDER — MORPHINE SULFATE 30 MG/1
30 CAPSULE, EXTENDED RELEASE ORAL DAILY
Qty: 30 CAPSULE | Refills: 0 | Status: SHIPPED | OUTPATIENT
Start: 2024-07-30 | End: 2024-08-01

## 2024-07-30 ASSESSMENT — ENCOUNTER SYMPTOMS: BACK PAIN: 1

## 2024-07-30 NOTE — PROGRESS NOTES
Subjective  Dom Grigsby 1940 is a 83 y.o. male who presents today with:  Chief Complaint   Patient presents with    Back Pain     3 month follow up for chronic morphine therapy. UDS and medication contract - DUE.       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  He does see endocrinology.  He also takes levothyroxine for his thyroid.  He continues testosterone treatment as directed.    Depression     His depression has been well controlled.  He does take duloxetine to try to help with pain as well.  He feels his moods are stable.  He does use hydroxyzine for his anxiety.    Skin Tag     Would like to have additional skin tags/moles removed today.       Back Pain    Depression    I have reviewed HPI and agree with above.  Long history of back pain.  Spinal stenosis of the lumbar and cervical spine.  He has been on long-acting morphine for years.  His pain is fairly well-controlled.  He is becoming more active.  He is going for walks around the block.  He notes that it does bother his left knee a little more but he is getting ready to start therapy for that.  He continues to do well with the testosterone therapy.  His dose was recently adjusted down due to elevated serum levels.    Controlled substances monitoring: possible medication side effects, risk of tolerance and/or dependence, and alternative treatments discussed, no signs of potential drug abuse or diversion identified, and OARRS report reviewed today- activity consistent with treatment plan.     Review of Systems   Musculoskeletal:  Positive for back pain.   Psychiatric/Behavioral:  Positive for depression.    All other systems reviewed and are negative.      History reviewed. No pertinent past medical history.  History reviewed. No pertinent surgical history.  Social History     Socioeconomic History    Marital status:      Spouse name: Not on file    Number of children: Not on file    Years of education:

## 2024-07-31 ENCOUNTER — TELEPHONE (OUTPATIENT)
Dept: FAMILY MEDICINE CLINIC | Age: 84
End: 2024-07-31

## 2024-07-31 DIAGNOSIS — M48.061 SPINAL STENOSIS OF LUMBAR REGION AT MULTIPLE LEVELS: ICD-10-CM

## 2024-07-31 NOTE — TELEPHONE ENCOUNTER
Patient calling in, morphine was prescribed yesterday (7/30/24). Pharmacy does not have this in stock, they have the tablet       - can they switch this to the tablet?    Also, the Nystatin suspension - they don't have this one and they want the office to call to see if alternative can be prescribed    [facility] phone [phone]

## 2024-07-31 NOTE — TELEPHONE ENCOUNTER
Patient called back stating the Morphine should also have been written for him to take twice-a-day.

## 2024-08-01 RX ORDER — MORPHINE SULFATE 30 MG/1
30 TABLET ORAL 2 TIMES DAILY PRN
Qty: 60 TABLET | Refills: 0 | OUTPATIENT
Start: 2024-08-01 | End: 2024-08-31

## 2024-08-01 RX ORDER — MORPHINE SULFATE 30 MG/1
30 TABLET, FILM COATED, EXTENDED RELEASE ORAL EVERY 12 HOURS
Qty: 60 TABLET | Refills: 0 | Status: SHIPPED | OUTPATIENT
Start: 2024-08-01 | End: 2024-08-31

## 2024-08-02 LAB
6MAM UR QL: NOT DETECTED
7-AMINOCLONAZEPAM: NOT DETECTED
ALPHA-OH-ALPRAZOLAM: NOT DETECTED
ALPHA-OH-MIDAZOLAM, URINE: NOT DETECTED
ALPRAZOLAM: NOT DETECTED
AMPHET UR QL SCN: NOT DETECTED
BARBITURATES: NEGATIVE
BENZOYLECGONINE: NEGATIVE
BUPRENORPHINE: NOT DETECTED
CARISOPRODOL UR QL: NEGATIVE
CLONAZEPAM UR QL: NOT DETECTED
CODEINE: NOT DETECTED
CREAT UR-MCNC: 107 MG/DL (ref 20–400)
DIAZEPAM: NOT DETECTED
ETHYL GLUCURONIDE: NORMAL
FENTANYL UR QL: NOT DETECTED
GABAPENTIN: NOT DETECTED
HYDROCODONE UR QL: NOT DETECTED
HYDROMORPHONE: PRESENT
LORAZEPAM UR QL: NOT DETECTED
MARIJUANA METABOLITE: NEGATIVE
MDA: NOT DETECTED
MDEA: NOT DETECTED
MDMA UR QL: NOT DETECTED
MEPERIDINE: NOT DETECTED
METHADONE: NEGATIVE
METHAMPHETAMINE: NOT DETECTED
METHYLPHENIDATE: NOT DETECTED
MIDAZOLAM UR QL SCN: NOT DETECTED
MORPHINE: PRESENT
NALOXONE: NOT DETECTED
NORBUPRENORPHINE, FREE: NOT DETECTED
NORDIAZEPAM: NOT DETECTED
NORFENTANYL: NOT DETECTED
NORHYDROCODONE, URINE: NOT DETECTED
NOROXYCODONE: PRESENT
NOROXYMORPHONE, URINE: NOT DETECTED
OXAZEPAM UR QL: NOT DETECTED
OXYCODONE UR QL: PRESENT
OXYMORPHONE UR QL: PRESENT
PAIN MANAGEMENT DRUG PANEL: NORMAL
PATHOLOGY STUDY: NORMAL
PCP: NEGATIVE
PHENTERMINE: NOT DETECTED
PREGABALIN: PRESENT
TAPENTADOL, URINE: NOT DETECTED
TAPENTADOL-O-SULFATE, URINE: NOT DETECTED
TEMAZEPAM: NOT DETECTED
TRAMADOL: NEGATIVE
ZOLPIDEM: NOT DETECTED

## 2024-08-12 RX ORDER — GUANFACINE 2 MG/1
1 TABLET ORAL NIGHTLY
Qty: 30 TABLET | Refills: 0 | Status: SHIPPED | OUTPATIENT
Start: 2024-08-12

## 2024-08-14 RX ORDER — CYCLOBENZAPRINE HCL 10 MG
TABLET ORAL
Qty: 30 TABLET | Refills: 3 | Status: SHIPPED | OUTPATIENT
Start: 2024-08-14

## 2024-08-19 ENCOUNTER — TELEPHONE (OUTPATIENT)
Dept: FAMILY MEDICINE CLINIC | Age: 84
End: 2024-08-19

## 2024-08-19 DIAGNOSIS — G89.29 CHRONIC PAIN OF LEFT KNEE: Primary | ICD-10-CM

## 2024-08-19 DIAGNOSIS — M25.562 CHRONIC PAIN OF LEFT KNEE: Primary | ICD-10-CM

## 2024-08-19 RX ORDER — HYDROXYZINE HYDROCHLORIDE 25 MG/1
TABLET, FILM COATED ORAL
Qty: 90 TABLET | Refills: 3 | Status: SHIPPED | OUTPATIENT
Start: 2024-08-19

## 2024-08-19 NOTE — TELEPHONE ENCOUNTER
Future Appointments    Encounter Information   Provider Department Appt Notes   10/30/2024 Arnaud Plunkett DO [facility] Primary and Specialty Care 3 month follow up     Past Visits    Date Provider Specialty Visit Type Primary Dx   07/30/2024 Arnaud Plunkett DO Family Medicine Office Visit Spinal stenosis of lumbar region at multiple levels

## 2024-08-19 NOTE — TELEPHONE ENCOUNTER
PT requesting referral for Dr Sun in [place]- to have left knee evaluated.      Please left PT know if approved or denied

## 2024-08-28 DIAGNOSIS — M48.061 SPINAL STENOSIS OF LUMBAR REGION AT MULTIPLE LEVELS: ICD-10-CM

## 2024-08-28 RX ORDER — MORPHINE SULFATE 30 MG/1
30 TABLET, FILM COATED, EXTENDED RELEASE ORAL EVERY 12 HOURS
Qty: 60 TABLET | Refills: 0 | Status: SHIPPED | OUTPATIENT
Start: 2024-08-28 | End: 2024-09-27

## 2024-08-28 NOTE — TELEPHONE ENCOUNTER
Future Appointments    Encounter Information   Provider Department Appt Notes   10/30/2024 Arnaud Plunkett DO [facility] Primary and Specialty Care 3 month follow up     Past Visits    Date Provider Specialty Visit Type Primary Dx   07/30/2024 Arnaud Plunkett DO Family Medicine Office Visit Spinal stenosis of lumbar region at multiple levels

## 2024-09-04 DIAGNOSIS — M48.061 SPINAL STENOSIS OF LUMBAR REGION AT MULTIPLE LEVELS: ICD-10-CM

## 2024-09-04 RX ORDER — PREGABALIN 50 MG/1
50 CAPSULE ORAL 3 TIMES DAILY
Qty: 270 CAPSULE | Refills: 0 | Status: SHIPPED | OUTPATIENT
Start: 2024-09-04 | End: 2024-12-03

## 2024-09-04 NOTE — TELEPHONE ENCOUNTER
Future Appointments    Encounter Information   Provider Department Appt Notes   10/30/2024 Arnaud Plunkett DO [facility] Primary and Specialty Care 3 month follow up     Past Visits    Date Provider Specialty Visit Type Primary Dx   07/30/2024 Arnaud Plunkett DO Family Medicine Office Visit Spinal stenosis of lumbar region at multiple levels

## 2024-09-05 ENCOUNTER — TELEPHONE (OUTPATIENT)
Dept: FAMILY MEDICINE CLINIC | Age: 84
End: 2024-09-05

## 2024-09-05 DIAGNOSIS — M48.061 SPINAL STENOSIS OF LUMBAR REGION AT MULTIPLE LEVELS: Primary | ICD-10-CM

## 2024-09-05 NOTE — TELEPHONE ENCOUNTER
Patient had MRSA about 2 weeks ago. He was on an antibiotic to clear it up before his surgery last week. He said they never did another test to see if it's gone and wants to know if you can order a test for MRSA. He will have it done at [facility].      Please advise, thank you.

## 2024-09-09 RX ORDER — GUANFACINE 2 MG/1
1 TABLET ORAL NIGHTLY
Qty: 30 TABLET | Refills: 0 | Status: SHIPPED | OUTPATIENT
Start: 2024-09-09

## 2024-09-23 RX ORDER — POTASSIUM CHLORIDE 750 MG/1
10 CAPSULE, EXTENDED RELEASE ORAL 2 TIMES DAILY
Qty: 180 CAPSULE | Refills: 1 | Status: SHIPPED | OUTPATIENT
Start: 2024-09-23

## 2024-09-26 DIAGNOSIS — M48.061 SPINAL STENOSIS OF LUMBAR REGION AT MULTIPLE LEVELS: ICD-10-CM

## 2024-09-26 RX ORDER — MORPHINE SULFATE 30 MG/1
30 TABLET, FILM COATED, EXTENDED RELEASE ORAL EVERY 12 HOURS
Qty: 60 TABLET | Refills: 0 | Status: SHIPPED | OUTPATIENT
Start: 2024-09-26 | End: 2024-10-26

## 2024-10-07 RX ORDER — GUANFACINE 2 MG/1
1 TABLET ORAL NIGHTLY
Qty: 30 TABLET | Refills: 0 | Status: SHIPPED | OUTPATIENT
Start: 2024-10-07

## 2024-10-09 ENCOUNTER — PATIENT MESSAGE (OUTPATIENT)
Dept: FAMILY MEDICINE CLINIC | Age: 84
End: 2024-10-09

## 2024-10-15 NOTE — TELEPHONE ENCOUNTER
Patient requesting medication refill. Please approve or deny this request.    Rx requested:  Requested Prescriptions     Pending Prescriptions Disp Refills    MAG64 64 MG TBEC extended release tablet 60 tablet      Sig: Take 1 tablet by mouth nightly         Last Office Visit:   7/30/2024      Next Visit Date:  Future Appointments   Date Time Provider Department Center   10/30/2024  2:30 PM Arnaud Plunkett DO [facility]

## 2024-10-16 RX ORDER — MAGNESIUM 64 MG (MAGNESIUM CHLORIDE) TABLET,DELAYED RELEASE
1 2 TIMES DAILY WITH MEALS
Qty: 180 TABLET | Refills: 3 | Status: SHIPPED | OUTPATIENT
Start: 2024-10-16

## 2024-10-18 ENCOUNTER — TELEMEDICINE (OUTPATIENT)
Dept: FAMILY MEDICINE CLINIC | Age: 84
End: 2024-10-18

## 2024-10-18 DIAGNOSIS — Z00.00 INITIAL MEDICARE ANNUAL WELLNESS VISIT: Primary | ICD-10-CM

## 2024-10-18 SDOH — ECONOMIC STABILITY: FOOD INSECURITY: WITHIN THE PAST 12 MONTHS, THE FOOD YOU BOUGHT JUST DIDN'T LAST AND YOU DIDN'T HAVE MONEY TO GET MORE.: NEVER TRUE

## 2024-10-18 SDOH — ECONOMIC STABILITY: INCOME INSECURITY: HOW HARD IS IT FOR YOU TO PAY FOR THE VERY BASICS LIKE FOOD, HOUSING, MEDICAL CARE, AND HEATING?: NOT HARD AT ALL

## 2024-10-18 SDOH — ECONOMIC STABILITY: FOOD INSECURITY: WITHIN THE PAST 12 MONTHS, YOU WORRIED THAT YOUR FOOD WOULD RUN OUT BEFORE YOU GOT MONEY TO BUY MORE.: NEVER TRUE

## 2024-10-18 ASSESSMENT — PATIENT HEALTH QUESTIONNAIRE - PHQ9
10. IF YOU CHECKED OFF ANY PROBLEMS, HOW DIFFICULT HAVE THESE PROBLEMS MADE IT FOR YOU TO DO YOUR WORK, TAKE CARE OF THINGS AT HOME, OR GET ALONG WITH OTHER PEOPLE: NOT DIFFICULT AT ALL
5. POOR APPETITE OR OVEREATING: NOT AT ALL
7. TROUBLE CONCENTRATING ON THINGS, SUCH AS READING THE NEWSPAPER OR WATCHING TELEVISION: NOT AT ALL
SUM OF ALL RESPONSES TO PHQ QUESTIONS 1-9: 0
3. TROUBLE FALLING OR STAYING ASLEEP: NOT AT ALL
4. FEELING TIRED OR HAVING LITTLE ENERGY: NOT AT ALL
SUM OF ALL RESPONSES TO PHQ QUESTIONS 1-9: 0
6. FEELING BAD ABOUT YOURSELF - OR THAT YOU ARE A FAILURE OR HAVE LET YOURSELF OR YOUR FAMILY DOWN: NOT AT ALL
SUM OF ALL RESPONSES TO PHQ QUESTIONS 1-9: 0
9. THOUGHTS THAT YOU WOULD BE BETTER OFF DEAD, OR OF HURTING YOURSELF: NOT AT ALL
2. FEELING DOWN, DEPRESSED OR HOPELESS: NOT AT ALL
1. LITTLE INTEREST OR PLEASURE IN DOING THINGS: NOT AT ALL
8. MOVING OR SPEAKING SO SLOWLY THAT OTHER PEOPLE COULD HAVE NOTICED. OR THE OPPOSITE, BEING SO FIGETY OR RESTLESS THAT YOU HAVE BEEN MOVING AROUND A LOT MORE THAN USUAL: NOT AT ALL
SUM OF ALL RESPONSES TO PHQ QUESTIONS 1-9: 0
SUM OF ALL RESPONSES TO PHQ9 QUESTIONS 1 & 2: 0

## 2024-10-18 ASSESSMENT — LIFESTYLE VARIABLES
HOW MANY STANDARD DRINKS CONTAINING ALCOHOL DO YOU HAVE ON A TYPICAL DAY: 1 OR 2
HOW OFTEN DO YOU HAVE A DRINK CONTAINING ALCOHOL: MONTHLY OR LESS

## 2024-10-18 NOTE — PROGRESS NOTES
lungs Yes Tommy Garcia MD   hydrocortisone (CORTEF) 10 MG tablet  Yes Tommy Garcia MD   SYNTHROID 150 MCG tablet  Yes Tommy Garcia MD   tamsulosin (FLOMAX) 0.4 MG capsule Take 1 capsule by mouth 2 times daily Yes Tommy Garcia MD   Multiple Vitamins-Minerals (MULTIVITAMIN ADULT EXTRA C PO) Take 1 tablet by mouth daily Yes Tommy Garcia MD   Calcium Carbonate-Vitamin D (OYSTER SHELL CALCIUM/D) 500-5 MG-MCG TABS Take 500 tablets by mouth daily Yes Tommy Garcia MD   omeprazole (PRILOSEC) 20 MG delayed release capsule Take 1 capsule by mouth daily Yes Tommy Garcia MD   polyethylene glycol (GLYCOLAX) 17 g packet Take 1 packet by mouth daily as needed for Constipation Yes Tommy Garcia MD   psyllium (KONSYL) 28.3 % PACK Take by mouth daily Yes Tommy Garcia MD   sennosides-docusate sodium (SENOKOT-S) 8.6-50 MG tablet Take 1 tablet by mouth daily Yes Tommy Garcia MD   OXYGEN Inhale into the lungs 2 LPM at night due to restrictive lung disease Yes Tommy Garcia MD   testosterone cypionate (DEPOTESTOTERONE CYPIONATE) 100 MG/ML injection Inject 0.25 mLs into the muscle once a week for 90 days. Max Daily Amount: 25 mg  Arnaud Plunkett DO       [facility] (Including outside providers/suppliers regularly involved in providing care):   Patient Care Team:  Arnaud Plunkett DO as PCP - General (Family Medicine)  Arnaud Plunkett DO as PCP - Empaneled Provider      Reviewed and updated this visit:  Allergies  Meds          Dom Grigsby, was evaluated through a synchronous (real-time) audio-video encounter. The patient (or guardian if applicable) is aware that this is a billable service, which includes applicable co-pays. This Virtual Visit was conducted with patient's (and/or legal guardian's) consent. Patient identification was verified, and a caregiver was present when appropriate.   The patient was located at Home: [address] [place] [address]

## 2024-10-18 NOTE — PATIENT INSTRUCTIONS
Learning About Being Active as an Older Adult  Why is being active important as you get older?     Being active is one of the best things you can do for your health. And it's never too late to start. Being active--or getting active, if you aren't already--has definite benefits. It can:  Give you more energy,  Keep your mind sharp.  Improve balance to reduce your risk of falls.  Help you manage chronic illness with fewer medicines.  No matter how old you are, how fit you are, or what health problems you have, there is a form of activity that will work for you. And the more physical activity you can do, the better your overall health will be.  What kinds of activity can help you stay healthy?  Being more active will make your daily activities easier. Physical activity includes planned exercise and things you do in daily life. There are four types of activity:  Aerobic.  Doing aerobic activity makes your heart and lungs strong.  Includes walking, dancing, and gardening.  Aim for at least 2½ hours spread throughout the week.  It improves your energy and can help you sleep better.  Muscle-strengthening.  This type of activity can help maintain muscle and strengthen bones.  Includes climbing stairs, using resistance bands, and lifting or carrying heavy loads.  Aim for at least twice a week.  It can help protect the knees and other joints.  Stretching.  Stretching gives you better range of motion in joints and muscles.  Includes upper arm stretches, calf stretches, and gentle yoga.  Aim for at least twice a week, preferably after your muscles are warmed up from other activities.  It can help you function better in daily life.  Balancing.  This helps you stay coordinated and have good posture.  Includes heel-to-toe walking, kelly chi, and certain types of yoga.  Aim for at least 3 days a week.  It can reduce your risk of falling.  Even if you have a hard time meeting the recommendations, it's better to be more active

## 2024-10-24 DIAGNOSIS — M48.061 SPINAL STENOSIS OF LUMBAR REGION AT MULTIPLE LEVELS: ICD-10-CM

## 2024-10-24 RX ORDER — MORPHINE SULFATE 30 MG/1
30 TABLET, FILM COATED, EXTENDED RELEASE ORAL EVERY 12 HOURS
Qty: 60 TABLET | Refills: 0 | Status: SHIPPED | OUTPATIENT
Start: 2024-10-24 | End: 2024-11-23

## 2024-10-27 SDOH — ECONOMIC STABILITY: FOOD INSECURITY: WITHIN THE PAST 12 MONTHS, YOU WORRIED THAT YOUR FOOD WOULD RUN OUT BEFORE YOU GOT MONEY TO BUY MORE.: NEVER TRUE

## 2024-10-27 SDOH — ECONOMIC STABILITY: FOOD INSECURITY: WITHIN THE PAST 12 MONTHS, THE FOOD YOU BOUGHT JUST DIDN'T LAST AND YOU DIDN'T HAVE MONEY TO GET MORE.: NEVER TRUE

## 2024-10-27 SDOH — ECONOMIC STABILITY: INCOME INSECURITY: HOW HARD IS IT FOR YOU TO PAY FOR THE VERY BASICS LIKE FOOD, HOUSING, MEDICAL CARE, AND HEATING?: NOT HARD AT ALL

## 2024-10-27 SDOH — ECONOMIC STABILITY: TRANSPORTATION INSECURITY
IN THE PAST 12 MONTHS, HAS LACK OF TRANSPORTATION KEPT YOU FROM MEETINGS, WORK, OR FROM GETTING THINGS NEEDED FOR DAILY LIVING?: NO

## 2024-10-30 ENCOUNTER — OFFICE VISIT (OUTPATIENT)
Dept: FAMILY MEDICINE CLINIC | Age: 84
End: 2024-10-30

## 2024-10-30 VITALS
WEIGHT: 219 LBS | SYSTOLIC BLOOD PRESSURE: 138 MMHG | OXYGEN SATURATION: 92 % | HEART RATE: 77 BPM | TEMPERATURE: 98 F | BODY MASS INDEX: 29.7 KG/M2 | DIASTOLIC BLOOD PRESSURE: 70 MMHG

## 2024-10-30 DIAGNOSIS — E29.1 MALE HYPOGONADISM: ICD-10-CM

## 2024-10-30 DIAGNOSIS — Z23 NEED FOR INFLUENZA VACCINATION: ICD-10-CM

## 2024-10-30 DIAGNOSIS — G89.29 OTHER CHRONIC PAIN: ICD-10-CM

## 2024-10-30 DIAGNOSIS — E27.1 ADDISON'S DISEASE (HCC): ICD-10-CM

## 2024-10-30 DIAGNOSIS — R11.0 CHRONIC NAUSEA: Primary | ICD-10-CM

## 2024-10-30 RX ORDER — PROMETHAZINE HYDROCHLORIDE 25 MG/1
25 TABLET ORAL 4 TIMES DAILY PRN
Qty: 30 TABLET | Refills: 5 | Status: SHIPPED | OUTPATIENT
Start: 2024-10-30

## 2024-10-30 ASSESSMENT — ENCOUNTER SYMPTOMS: BACK PAIN: 1

## 2024-10-30 NOTE — PROGRESS NOTES
Subjective  Dom Grigsby 1940 is a 83 y.o. male who presents today with:  Chief Complaint   Patient presents with    Back Pain     3 month follow up for chronic morphine therapy. UDS and medication contract - DUE.       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  He does see endocrinology.  He also takes levothyroxine for his thyroid.  He continues testosterone treatment as directed.      Depression     His depression has been well controlled.  He does take duloxetine to try to help with pain as well.  He feels his moods are stable.  He does use hydroxyzine for his anxiety.      Tinnitus     C/O bilateral tinnitus.     He has also had bilateral ear pain and wax in his ears. He has been using debrox drops.     Other     Requesting flu shot today.        Back Pain    Depression  Other      I have reviewed HPI and agree with above.  He continues to have his dizziness.  He has ringing in his ears and hearing loss.  He is currently in physical therapy for his gait.  He also continues to be very hot.  He can sweats at the drop of a hat.  His testosterone levels were reviewed and while his total test is normal his free testosterone is very low.  His sex hormone binding globulin was not done.    Controlled substances monitoring: possible medication side effects, risk of tolerance and/or dependence, and alternative treatments discussed, no signs of potential drug abuse or diversion identified, and OARRS report reviewed today- activity consistent with treatment plan.     Review of Systems   Musculoskeletal:  Positive for back pain.   Psychiatric/Behavioral:  Positive for depression.    All other systems reviewed and are negative.      History reviewed. No pertinent past medical history.  History reviewed. No pertinent surgical history.  Social History     Socioeconomic History    Marital status:      Spouse name: Not on file    Number of children: Not on file    Years of

## 2024-11-04 RX ORDER — GUANFACINE 2 MG/1
1 TABLET ORAL NIGHTLY
Qty: 30 TABLET | Refills: 5 | Status: SHIPPED | OUTPATIENT
Start: 2024-11-04

## 2024-11-06 RX ORDER — NYSTATIN 100000 [USP'U]/ML
SUSPENSION ORAL
Qty: 480 ML | Refills: 0 | Status: SHIPPED | OUTPATIENT
Start: 2024-11-06

## 2024-11-25 DIAGNOSIS — M48.061 SPINAL STENOSIS OF LUMBAR REGION AT MULTIPLE LEVELS: ICD-10-CM

## 2024-11-25 RX ORDER — PREGABALIN 50 MG/1
50 CAPSULE ORAL 3 TIMES DAILY
Qty: 270 CAPSULE | Refills: 0 | Status: SHIPPED | OUTPATIENT
Start: 2024-11-25 | End: 2025-02-23

## 2024-11-25 RX ORDER — MORPHINE SULFATE 30 MG/1
30 TABLET, FILM COATED, EXTENDED RELEASE ORAL EVERY 12 HOURS
Qty: 60 TABLET | Refills: 0 | Status: SHIPPED | OUTPATIENT
Start: 2024-11-25 | End: 2024-12-25

## 2024-11-25 NOTE — TELEPHONE ENCOUNTER
Spouse requesting medication refill. Please approve or deny this request.    Rx requested:  Requested Prescriptions     Pending Prescriptions Disp Refills    morphine (MS CONTIN) 30 MG extended release tablet 60 tablet 0     Sig: Take 1 tablet by mouth in the morning and 1 tablet in the evening. Do all this for 30 days. Max Daily Amount: 60 mg.         Last Office Visit:   10/30/2024      Next Visit Date:  Future Appointments   Date Time Provider Department Center   1/30/2025  3:00 PM Arnaud Plunkett,  [facility] DEP

## 2024-12-07 DIAGNOSIS — E29.1 MALE HYPOGONADISM: ICD-10-CM

## 2024-12-08 NOTE — TELEPHONE ENCOUNTER
requesting medication refill. Please approve or deny this request.    Rx requested:  Requested Prescriptions     Pending Prescriptions Disp Refills    testosterone cypionate (DEPOTESTOTERONE CYPIONATE) 100 MG/ML injection 3 mL 0     Sig: Inject 0.25 mLs into the muscle once a week for 90 days. Max Daily Amount: 25 mg         Last Office Visit:   10/30/2024      Next Visit Date:  Future Appointments   Date Time Provider Department Center   1/30/2025  3:00 PM Arnaud Plunkett,  [facility] DEP

## 2024-12-09 RX ORDER — TESTOSTERONE CYPIONATE 1000 MG/10ML
25 INJECTION, SOLUTION INTRAMUSCULAR WEEKLY
Qty: 3 ML | Refills: 0 | Status: SHIPPED | OUTPATIENT
Start: 2024-12-09 | End: 2025-03-09

## 2024-12-17 RX ORDER — HYDROXYZINE HYDROCHLORIDE 25 MG/1
TABLET, FILM COATED ORAL
Qty: 90 TABLET | Refills: 3 | Status: SHIPPED | OUTPATIENT
Start: 2024-12-17

## 2024-12-23 DIAGNOSIS — M48.061 SPINAL STENOSIS OF LUMBAR REGION AT MULTIPLE LEVELS: ICD-10-CM

## 2024-12-23 RX ORDER — MORPHINE SULFATE 30 MG/1
30 TABLET, FILM COATED, EXTENDED RELEASE ORAL EVERY 12 HOURS
Qty: 60 TABLET | Refills: 0 | Status: SHIPPED | OUTPATIENT
Start: 2024-12-23 | End: 2025-01-22

## 2024-12-30 ENCOUNTER — TELEPHONE (OUTPATIENT)
Age: 84
End: 2024-12-30

## 2024-12-30 DIAGNOSIS — M54.2 CHRONIC NECK PAIN: Primary | ICD-10-CM

## 2024-12-30 DIAGNOSIS — G89.29 CHRONIC NECK PAIN: Primary | ICD-10-CM

## 2024-12-30 NOTE — TELEPHONE ENCOUNTER
Patient called wanting to know if Dr. Plunkett can order an X Ray to if C3 and C4 disks are ok. Patient has pain in neck arm and back. Patient would like a call back with recommendation. Patient says he has felt this was since 12/25/2024  [phone]

## 2024-12-31 ENCOUNTER — TELEPHONE (OUTPATIENT)
Age: 84
End: 2024-12-31

## 2024-12-31 DIAGNOSIS — G89.29 CHRONIC NECK PAIN: Primary | ICD-10-CM

## 2024-12-31 DIAGNOSIS — M54.2 CHRONIC NECK PAIN: Primary | ICD-10-CM

## 2024-12-31 RX ORDER — BACLOFEN 10 MG/1
10 TABLET ORAL 3 TIMES DAILY
Qty: 20 TABLET | Refills: 0 | Status: SHIPPED | OUTPATIENT
Start: 2024-12-31

## 2024-12-31 NOTE — TELEPHONE ENCOUNTER
Patient is having trouble with pain in neck, down his back and up into his skull. Also some shortness of breath.  Asking what else could be done to help with this pain. Please advise

## 2025-01-13 RX ORDER — HYDROXYZINE HYDROCHLORIDE 25 MG/1
TABLET, FILM COATED ORAL
Qty: 90 TABLET | Refills: 3 | Status: SHIPPED | OUTPATIENT
Start: 2025-01-13

## 2025-01-17 RX ORDER — DULOXETIN HYDROCHLORIDE 60 MG/1
CAPSULE, DELAYED RELEASE ORAL DAILY
Qty: 90 CAPSULE | Refills: 3 | Status: SHIPPED | OUTPATIENT
Start: 2025-01-17

## 2025-01-17 RX ORDER — DICLOFENAC SODIUM 75 MG/1
75 TABLET, DELAYED RELEASE ORAL 2 TIMES DAILY
Qty: 180 TABLET | Refills: 1 | Status: SHIPPED | OUTPATIENT
Start: 2025-01-17

## 2025-01-20 ENCOUNTER — TELEPHONE (OUTPATIENT)
Age: 85
End: 2025-01-20

## 2025-01-20 DIAGNOSIS — E29.1 MALE HYPOGONADISM: Primary | ICD-10-CM

## 2025-01-20 DIAGNOSIS — E27.1 ADDISON'S DISEASE (HCC): ICD-10-CM

## 2025-01-20 RX ORDER — CYCLOBENZAPRINE HCL 10 MG
TABLET ORAL
Qty: 30 TABLET | Refills: 0 | Status: SHIPPED | OUTPATIENT
Start: 2025-01-20

## 2025-01-20 NOTE — TELEPHONE ENCOUNTER
Patient is requesting lab orders to be faxed to [facility]. Patient stated importance of Testosterone lab be included in Orders. NOV: 1/30/25

## 2025-01-27 DIAGNOSIS — M48.061 SPINAL STENOSIS OF LUMBAR REGION AT MULTIPLE LEVELS: ICD-10-CM

## 2025-01-27 RX ORDER — DICLOFENAC SODIUM 75 MG/1
75 TABLET, DELAYED RELEASE ORAL 2 TIMES DAILY
Qty: 180 TABLET | Refills: 1 | Status: SHIPPED | OUTPATIENT
Start: 2025-01-27

## 2025-01-27 RX ORDER — MORPHINE SULFATE 30 MG/1
30 TABLET, FILM COATED, EXTENDED RELEASE ORAL EVERY 12 HOURS
Qty: 60 TABLET | Refills: 0 | Status: SHIPPED | OUTPATIENT
Start: 2025-01-27 | End: 2025-02-26

## 2025-01-27 RX ORDER — NYSTATIN 100000 U/G
CREAM TOPICAL
Qty: 30 G | Refills: 1 | Status: SHIPPED | OUTPATIENT
Start: 2025-01-27

## 2025-01-27 RX ORDER — DULOXETIN HYDROCHLORIDE 60 MG/1
60 CAPSULE, DELAYED RELEASE ORAL DAILY
Qty: 90 CAPSULE | Refills: 1 | Status: SHIPPED | OUTPATIENT
Start: 2025-01-27

## 2025-01-27 ASSESSMENT — PATIENT HEALTH QUESTIONNAIRE - PHQ9
10. IF YOU CHECKED OFF ANY PROBLEMS, HOW DIFFICULT HAVE THESE PROBLEMS MADE IT FOR YOU TO DO YOUR WORK, TAKE CARE OF THINGS AT HOME, OR GET ALONG WITH OTHER PEOPLE: NOT DIFFICULT AT ALL
9. THOUGHTS THAT YOU WOULD BE BETTER OFF DEAD, OR OF HURTING YOURSELF: NOT AT ALL
4. FEELING TIRED OR HAVING LITTLE ENERGY: SEVERAL DAYS
7. TROUBLE CONCENTRATING ON THINGS, SUCH AS READING THE NEWSPAPER OR WATCHING TELEVISION: NOT AT ALL
2. FEELING DOWN, DEPRESSED OR HOPELESS: NOT AT ALL
SUM OF ALL RESPONSES TO PHQ QUESTIONS 1-9: 1
3. TROUBLE FALLING OR STAYING ASLEEP: NOT AT ALL
SUM OF ALL RESPONSES TO PHQ QUESTIONS 1-9: 1
SUM OF ALL RESPONSES TO PHQ QUESTIONS 1-9: 1
SUM OF ALL RESPONSES TO PHQ9 QUESTIONS 1 & 2: 0
SUM OF ALL RESPONSES TO PHQ QUESTIONS 1-9: 1
8. MOVING OR SPEAKING SO SLOWLY THAT OTHER PEOPLE COULD HAVE NOTICED. OR THE OPPOSITE - BEING SO FIDGETY OR RESTLESS THAT YOU HAVE BEEN MOVING AROUND A LOT MORE THAN USUAL: NOT AT ALL
10. IF YOU CHECKED OFF ANY PROBLEMS, HOW DIFFICULT HAVE THESE PROBLEMS MADE IT FOR YOU TO DO YOUR WORK, TAKE CARE OF THINGS AT HOME, OR GET ALONG WITH OTHER PEOPLE: NOT DIFFICULT AT ALL
6. FEELING BAD ABOUT YOURSELF - OR THAT YOU ARE A FAILURE OR HAVE LET YOURSELF OR YOUR FAMILY DOWN: NOT AT ALL
5. POOR APPETITE OR OVEREATING: NOT AT ALL
7. TROUBLE CONCENTRATING ON THINGS, SUCH AS READING THE NEWSPAPER OR WATCHING TELEVISION: NOT AT ALL
8. MOVING OR SPEAKING SO SLOWLY THAT OTHER PEOPLE COULD HAVE NOTICED. OR THE OPPOSITE, BEING SO FIGETY OR RESTLESS THAT YOU HAVE BEEN MOVING AROUND A LOT MORE THAN USUAL: NOT AT ALL
1. LITTLE INTEREST OR PLEASURE IN DOING THINGS: NOT AT ALL
5. POOR APPETITE OR OVEREATING: NOT AT ALL
4. FEELING TIRED OR HAVING LITTLE ENERGY: SEVERAL DAYS
SUM OF ALL RESPONSES TO PHQ QUESTIONS 1-9: 1
9. THOUGHTS THAT YOU WOULD BE BETTER OFF DEAD, OR OF HURTING YOURSELF: NOT AT ALL
2. FEELING DOWN, DEPRESSED OR HOPELESS: NOT AT ALL
6. FEELING BAD ABOUT YOURSELF - OR THAT YOU ARE A FAILURE OR HAVE LET YOURSELF OR YOUR FAMILY DOWN: NOT AT ALL
3. TROUBLE FALLING OR STAYING ASLEEP: NOT AT ALL
1. LITTLE INTEREST OR PLEASURE IN DOING THINGS: NOT AT ALL

## 2025-01-27 NOTE — TELEPHONE ENCOUNTER
Patient requesting medication refill. Please approve or deny this request.    Rx requested:  Requested Prescriptions     Pending Prescriptions Disp Refills    morphine (MS CONTIN) 30 MG extended release tablet 60 tablet 0     Sig: Take 1 tablet by mouth in the morning and 1 tablet in the evening. Do all this for 30 days. Max Daily Amount: 60 mg.         Last Office Visit:   10/30/2024      Next Visit Date:  Future Appointments   Date Time Provider Department Center   1/30/2025  3:00 PM Arnaud Plunkett DO [facility] DEP

## 2025-01-28 RX ORDER — FUROSEMIDE 40 MG/1
40 TABLET ORAL DAILY
Qty: 60 TABLET | Refills: 2 | Status: SHIPPED | OUTPATIENT
Start: 2025-01-28

## 2025-01-30 ENCOUNTER — OFFICE VISIT (OUTPATIENT)
Age: 85
End: 2025-01-30

## 2025-01-30 VITALS
TEMPERATURE: 97.7 F | HEIGHT: 72 IN | HEART RATE: 86 BPM | DIASTOLIC BLOOD PRESSURE: 62 MMHG | BODY MASS INDEX: 28.99 KG/M2 | SYSTOLIC BLOOD PRESSURE: 118 MMHG | OXYGEN SATURATION: 95 % | WEIGHT: 214 LBS

## 2025-01-30 DIAGNOSIS — R06.02 SOB (SHORTNESS OF BREATH): ICD-10-CM

## 2025-01-30 DIAGNOSIS — G89.29 CHRONIC NECK PAIN: ICD-10-CM

## 2025-01-30 DIAGNOSIS — R25.2 MUSCLE CRAMPING: ICD-10-CM

## 2025-01-30 DIAGNOSIS — Z00.00 MEDICARE ANNUAL WELLNESS VISIT, SUBSEQUENT: Primary | ICD-10-CM

## 2025-01-30 DIAGNOSIS — M54.2 CHRONIC NECK PAIN: ICD-10-CM

## 2025-01-30 DIAGNOSIS — F33.1 MODERATE RECURRENT MAJOR DEPRESSION (HCC): ICD-10-CM

## 2025-01-30 DIAGNOSIS — J84.9 INTERSTITIAL LUNG DISEASE (HCC): ICD-10-CM

## 2025-01-30 DIAGNOSIS — G89.29 OTHER CHRONIC PAIN: ICD-10-CM

## 2025-01-30 DIAGNOSIS — E29.1 MALE HYPOGONADISM: ICD-10-CM

## 2025-01-30 DIAGNOSIS — E27.1 ADDISON'S DISEASE (HCC): ICD-10-CM

## 2025-01-30 RX ORDER — TRIAMCINOLONE ACETONIDE 40 MG/ML
80 INJECTION, SUSPENSION INTRA-ARTICULAR; INTRAMUSCULAR ONCE
Status: SHIPPED | OUTPATIENT
Start: 2025-01-30

## 2025-01-30 RX ORDER — BACLOFEN 10 MG/1
10 TABLET ORAL 3 TIMES DAILY
Qty: 20 TABLET | Refills: 0 | Status: SHIPPED | OUTPATIENT
Start: 2025-01-30

## 2025-01-30 SDOH — ECONOMIC STABILITY: FOOD INSECURITY: WITHIN THE PAST 12 MONTHS, YOU WORRIED THAT YOUR FOOD WOULD RUN OUT BEFORE YOU GOT MONEY TO BUY MORE.: NEVER TRUE

## 2025-01-30 SDOH — ECONOMIC STABILITY: FOOD INSECURITY: WITHIN THE PAST 12 MONTHS, THE FOOD YOU BOUGHT JUST DIDN'T LAST AND YOU DIDN'T HAVE MONEY TO GET MORE.: NEVER TRUE

## 2025-01-30 ASSESSMENT — LIFESTYLE VARIABLES
HOW OFTEN DO YOU HAVE A DRINK CONTAINING ALCOHOL: NEVER
HOW MANY STANDARD DRINKS CONTAINING ALCOHOL DO YOU HAVE ON A TYPICAL DAY: PATIENT DOES NOT DRINK

## 2025-01-30 ASSESSMENT — PATIENT HEALTH QUESTIONNAIRE - PHQ9
SUM OF ALL RESPONSES TO PHQ QUESTIONS 1-9: 1
8. MOVING OR SPEAKING SO SLOWLY THAT OTHER PEOPLE COULD HAVE NOTICED. OR THE OPPOSITE, BEING SO FIGETY OR RESTLESS THAT YOU HAVE BEEN MOVING AROUND A LOT MORE THAN USUAL: NOT AT ALL
2. FEELING DOWN, DEPRESSED OR HOPELESS: NOT AT ALL
10. IF YOU CHECKED OFF ANY PROBLEMS, HOW DIFFICULT HAVE THESE PROBLEMS MADE IT FOR YOU TO DO YOUR WORK, TAKE CARE OF THINGS AT HOME, OR GET ALONG WITH OTHER PEOPLE: NOT DIFFICULT AT ALL
SUM OF ALL RESPONSES TO PHQ QUESTIONS 1-9: 1
9. THOUGHTS THAT YOU WOULD BE BETTER OFF DEAD, OR OF HURTING YOURSELF: NOT AT ALL
3. TROUBLE FALLING OR STAYING ASLEEP: NOT AT ALL
SUM OF ALL RESPONSES TO PHQ9 QUESTIONS 1 & 2: 0
1. LITTLE INTEREST OR PLEASURE IN DOING THINGS: NOT AT ALL
7. TROUBLE CONCENTRATING ON THINGS, SUCH AS READING THE NEWSPAPER OR WATCHING TELEVISION: NOT AT ALL
4. FEELING TIRED OR HAVING LITTLE ENERGY: SEVERAL DAYS
5. POOR APPETITE OR OVEREATING: NOT AT ALL
6. FEELING BAD ABOUT YOURSELF - OR THAT YOU ARE A FAILURE OR HAVE LET YOURSELF OR YOUR FAMILY DOWN: NOT AT ALL

## 2025-01-30 NOTE — PROGRESS NOTES
diversion identified, and OARRS report reviewed today- activity consistent with treatment plan.   Patient's complete Health Risk Assessment and screening values have been reviewed and are found in Flowsheets. The following problems were reviewed today and where indicated follow up appointments were made and/or referrals ordered.    Positive Risk Factor Screenings with Interventions:    Fall Risk:  Do you feel unsteady or are you worried about falling? : (!) yes  2 or more falls in past year?: no  Fall with injury in past year?: no     Interventions:    Reviewed medications, home hazards, visual acuity, and co-morbidities that can increase risk for falls         Controlled Medication Review:    Today's Pain Level: No data recorded   Opioid Risk: (Low risk score <55) Opioid risk score: 30    Patient is low risk for opioid use disorder or overdose.    Last PDMP [place] as Reviewed:  Review User Review Instant Review Result   ARNAUD PLUNKETT 1/30/2025  3:17 PM     Reviewed PDMP [1]         General HRA Questions:  Select all that apply: (!) New or Increased Pain    Inactivity:  On average, how many days per week do you engage in moderate to strenuous exercise (like a brisk walk)?: 0 days (!) Abnormal  On average, how many minutes do you engage in exercise at this level?: 0 min                         Objective   Vitals:    01/30/25 1452   BP: 118/62   Pulse: 86   Temp: 97.7 °F (36.5 °C)   TempSrc: Temporal   SpO2: 95%   Weight: 97.1 kg (214 lb)   Height: 1.829 m (6')      Body mass index is 29.02 kg/m².        Physical Exam  Lungs are clear.              Allergies   Allergen Reactions    Penicillins Rash     Prior to Visit Medications    Medication Sig Taking? Authorizing Provider   furosemide (LASIX) 40 MG tablet TAKE 1 TABLET BY MOUTH DAILY Yes Arnaud Plunkett, DO   morphine (MS CONTIN) 30 MG extended release tablet Take 1 tablet by mouth in the morning and 1 tablet in the evening. Do all this for 30 days. Max Daily

## 2025-02-07 DIAGNOSIS — M48.061 SPINAL STENOSIS OF LUMBAR REGION AT MULTIPLE LEVELS: Primary | ICD-10-CM

## 2025-02-07 RX ORDER — CYCLOBENZAPRINE HCL 10 MG
TABLET ORAL
Qty: 30 TABLET | Refills: 0 | Status: SHIPPED | OUTPATIENT
Start: 2025-02-07

## 2025-02-13 RX ORDER — ALLOPURINOL 300 MG/1
300 TABLET ORAL DAILY
Qty: 90 TABLET | Refills: 3 | Status: SHIPPED | OUTPATIENT
Start: 2025-02-13

## 2025-02-15 DIAGNOSIS — R25.2 MUSCLE CRAMPING: ICD-10-CM

## 2025-02-15 DIAGNOSIS — G89.29 CHRONIC NECK PAIN: ICD-10-CM

## 2025-02-15 DIAGNOSIS — M54.2 CHRONIC NECK PAIN: ICD-10-CM

## 2025-02-15 NOTE — TELEPHONE ENCOUNTER
requesting medication refill. Please approve or deny this request.    Rx requested:  Requested Prescriptions     Pending Prescriptions Disp Refills    baclofen (LIORESAL) 10 MG tablet 20 tablet 0     Sig: Take 1 tablet by mouth 3 times daily         Last Office Visit:   1/30/2025      Next Visit Date:  Future Appointments   Date Time Provider Department Center   4/30/2025  3:00 PM Arnaud Plunkett DO [facility] DEP

## 2025-02-17 RX ORDER — BACLOFEN 10 MG/1
10 TABLET ORAL 3 TIMES DAILY
Qty: 20 TABLET | Refills: 0 | Status: SHIPPED | OUTPATIENT
Start: 2025-02-17

## 2025-02-24 DIAGNOSIS — M48.061 SPINAL STENOSIS OF LUMBAR REGION AT MULTIPLE LEVELS: ICD-10-CM

## 2025-02-24 RX ORDER — MORPHINE SULFATE 30 MG/1
30 TABLET, FILM COATED, EXTENDED RELEASE ORAL EVERY 12 HOURS
Qty: 60 TABLET | Refills: 0 | Status: SHIPPED | OUTPATIENT
Start: 2025-02-24 | End: 2025-03-26

## 2025-02-24 RX ORDER — PREGABALIN 50 MG/1
50 CAPSULE ORAL 3 TIMES DAILY
Qty: 270 CAPSULE | Refills: 0 | Status: SHIPPED | OUTPATIENT
Start: 2025-02-24 | End: 2025-05-25

## 2025-02-26 ENCOUNTER — TELEPHONE (OUTPATIENT)
Age: 85
End: 2025-02-26

## 2025-02-26 NOTE — TELEPHONE ENCOUNTER
Patient is aware Dr. Plunkett is not in the office.    Patient called in stating that the spasms he was having are beginning to get worse.    Patient is inquiring on what Dr. Plunkett would like him to do.    NV 04/30/2025    Please advise.

## 2025-02-26 NOTE — TELEPHONE ENCOUNTER
3rd attempt to reach patient. Called patient at [phone]. Patient answers and seems to not be able to hear, line will then disconnect.

## 2025-03-10 ENCOUNTER — TELEPHONE (OUTPATIENT)
Age: 85
End: 2025-03-10

## 2025-03-10 DIAGNOSIS — E29.1 MALE HYPOGONADISM: Primary | ICD-10-CM

## 2025-03-10 RX ORDER — NYSTATIN 100000 [USP'U]/ML
SUSPENSION ORAL
Qty: 480 ML | Refills: 0 | Status: SHIPPED | OUTPATIENT
Start: 2025-03-10

## 2025-03-10 NOTE — TELEPHONE ENCOUNTER
Patient called asking if he can have blood work done before his appt. He asked for an order to check testosterone. I see it was ordered and there are results scanned in media from [facility].      Please advise, thank you.

## 2025-03-10 NOTE — TELEPHONE ENCOUNTER
Patient is also requesting a script for an albuterol inhaler. I could not find the medication to pend.          Rx requested:  Requested Prescriptions     Pending Prescriptions Disp Refills    nystatin (MYCOSTATIN) 831701 UNIT/ML suspension 480 mL 0     Sig: SWISH & SWALLOW 5 ML BY MOUTH  EVERY 6 HOURS ,RETAIN  IN  THE  MOUTH  FOR  AS  LONG  AS  POSSIBLE  PRIOR  TO  SWALLOWING         Last Office Visit:   1/30/2025      Next Visit Date:  Future Appointments   Date Time Provider Department Center   3/24/2025  1:00 PM Arnaud Plunkett DO [facility] DEP   4/30/2025  3:00 PM Arnaud Plunkett DO [facility] DEP

## 2025-03-13 LAB
ALBUMIN: 3.7 G/DL
ALP BLD-CCNC: 127 U/L
ALT SERPL-CCNC: 22 U/L
ANION GAP SERPL CALCULATED.3IONS-SCNC: 7 MMOL/L
AST SERPL-CCNC: 18 U/L
BILIRUB SERPL-MCNC: 0.5 MG/DL (ref 0.1–1.4)
BUN BLDV-MCNC: 21 MG/DL
CALCIUM SERPL-MCNC: 8.6 MG/DL
CHLORIDE BLD-SCNC: 97 MMOL/L
CO2: 38 MMOL/L
CREAT SERPL-MCNC: 1 MG/DL
GFR, ESTIMATED: 75
GLUCOSE BLD-MCNC: 93 MG/DL
POTASSIUM SERPL-SCNC: 4.6 MMOL/L
SODIUM BLD-SCNC: 137 MMOL/L
TOTAL PROTEIN: 6.7 G/DL (ref 6.4–8.2)

## 2025-03-16 DIAGNOSIS — E29.1 MALE HYPOGONADISM: ICD-10-CM

## 2025-03-17 RX ORDER — TESTOSTERONE CYPIONATE 1000 MG/10ML
25 INJECTION, SOLUTION INTRAMUSCULAR WEEKLY
Qty: 3 ML | Refills: 2 | Status: SHIPPED | OUTPATIENT
Start: 2025-03-17 | End: 2025-06-15

## 2025-03-17 NOTE — TELEPHONE ENCOUNTER
Please approve or deny request. Thank you!    Rx requested:  Requested Prescriptions     Pending Prescriptions Disp Refills    testosterone cypionate (DEPOTESTOTERONE CYPIONATE) 100 MG/ML injection 3 mL 0     Sig: Inject 0.25 mLs into the muscle once a week for 90 days. Max Daily Amount: 25 mg         Last Office Visit:   1/30/2025      Next Visit Date:  Future Appointments   Date Time Provider Department Center   3/24/2025  1:00 PM Arnaud Plunkett DO [facility] DEP   4/30/2025  3:00 PM Arnaud Plunkett DO [facility] DEP

## 2025-03-20 ENCOUNTER — TELEPHONE (OUTPATIENT)
Age: 85
End: 2025-03-20

## 2025-03-20 RX ORDER — POTASSIUM CHLORIDE 750 MG/1
10 CAPSULE, EXTENDED RELEASE ORAL 2 TIMES DAILY
Qty: 180 CAPSULE | Refills: 1 | Status: SHIPPED | OUTPATIENT
Start: 2025-03-20

## 2025-03-20 NOTE — TELEPHONE ENCOUNTER
[facility] states they received a call from patient wanting the results from his recent labs faxed to his pcp because he has an appt on Monday 3/24.    Some of the labs were sent to [facility] and are still processing.

## 2025-03-24 ENCOUNTER — OFFICE VISIT (OUTPATIENT)
Age: 85
End: 2025-03-24
Payer: MEDICARE

## 2025-03-24 VITALS
SYSTOLIC BLOOD PRESSURE: 136 MMHG | DIASTOLIC BLOOD PRESSURE: 84 MMHG | OXYGEN SATURATION: 92 % | HEART RATE: 103 BPM | BODY MASS INDEX: 29.26 KG/M2 | HEIGHT: 72 IN | WEIGHT: 216 LBS | TEMPERATURE: 99 F

## 2025-03-24 DIAGNOSIS — R06.02 SOB (SHORTNESS OF BREATH): ICD-10-CM

## 2025-03-24 DIAGNOSIS — M48.061 SPINAL STENOSIS OF LUMBAR REGION AT MULTIPLE LEVELS: Primary | ICD-10-CM

## 2025-03-24 DIAGNOSIS — E29.1 MALE HYPOGONADISM: ICD-10-CM

## 2025-03-24 DIAGNOSIS — N13.8 BPH WITH URINARY OBSTRUCTION: ICD-10-CM

## 2025-03-24 DIAGNOSIS — J84.9 INTERSTITIAL LUNG DISEASE (HCC): ICD-10-CM

## 2025-03-24 DIAGNOSIS — M54.2 CHRONIC NECK PAIN: ICD-10-CM

## 2025-03-24 DIAGNOSIS — G89.29 CHRONIC NECK PAIN: ICD-10-CM

## 2025-03-24 DIAGNOSIS — R25.2 MUSCLE CRAMPING: ICD-10-CM

## 2025-03-24 DIAGNOSIS — N40.1 BPH WITH URINARY OBSTRUCTION: ICD-10-CM

## 2025-03-24 PROCEDURE — 1159F MED LIST DOCD IN RCRD: CPT | Performed by: FAMILY MEDICINE

## 2025-03-24 PROCEDURE — 1160F RVW MEDS BY RX/DR IN RCRD: CPT | Performed by: FAMILY MEDICINE

## 2025-03-24 PROCEDURE — 1123F ACP DISCUSS/DSCN MKR DOCD: CPT | Performed by: FAMILY MEDICINE

## 2025-03-24 PROCEDURE — 99214 OFFICE O/P EST MOD 30 MIN: CPT | Performed by: FAMILY MEDICINE

## 2025-03-24 RX ORDER — POTASSIUM CHLORIDE 750 MG/1
10 CAPSULE, EXTENDED RELEASE ORAL 2 TIMES DAILY
Qty: 180 CAPSULE | Refills: 1 | Status: SHIPPED | OUTPATIENT
Start: 2025-03-24

## 2025-03-24 RX ORDER — TRIAMCINOLONE ACETONIDE 40 MG/ML
80 INJECTION, SUSPENSION INTRA-ARTICULAR; INTRAMUSCULAR ONCE
Status: SHIPPED | OUTPATIENT
Start: 2025-03-24

## 2025-03-24 RX ORDER — BACLOFEN 10 MG/1
10 TABLET ORAL 3 TIMES DAILY
Qty: 90 TABLET | Refills: 1 | Status: SHIPPED | OUTPATIENT
Start: 2025-03-24

## 2025-03-24 RX ORDER — TADALAFIL 5 MG/1
5 TABLET ORAL DAILY
Qty: 90 TABLET | Refills: 1 | Status: SHIPPED | OUTPATIENT
Start: 2025-03-24 | End: 2025-03-25 | Stop reason: SDUPTHER

## 2025-03-24 RX ORDER — MORPHINE SULFATE 30 MG/1
30 TABLET, FILM COATED, EXTENDED RELEASE ORAL EVERY 12 HOURS
Qty: 60 TABLET | Refills: 0 | Status: SHIPPED | OUTPATIENT
Start: 2025-03-24 | End: 2025-04-23

## 2025-03-25 ENCOUNTER — TELEPHONE (OUTPATIENT)
Age: 85
End: 2025-03-25

## 2025-03-25 DIAGNOSIS — N13.8 BPH WITH URINARY OBSTRUCTION: ICD-10-CM

## 2025-03-25 DIAGNOSIS — N40.1 BPH WITH URINARY OBSTRUCTION: ICD-10-CM

## 2025-03-25 RX ORDER — TADALAFIL 5 MG/1
5 TABLET ORAL DAILY
Qty: 30 TABLET | Refills: 1 | Status: SHIPPED | OUTPATIENT
Start: 2025-03-25

## 2025-03-25 NOTE — TELEPHONE ENCOUNTER
Prior Authorization History  tadalafil (CIALIS) 5 MG tablet     History  PA Detail   View all authorizations for this medication  Denied   3/25/2025 11:27 AM  Appeal supported: No   Note from payer: Your request has been denied   Payer: Kathya ([employer]) Case ID: [id number]    [phone]    [phone]    Notes     Time User Attachment    Attachment received from payer. 3/25/2025 11:27 AM Felicita Castle Outgoing Prescription Prior Authorization Response Document    Waiting for Payer Response   3/25/2025  8:18 AM  Deadline to reply: March 29, 2025  8:16 AM Sending user: Romy Turpin   Payer: Kathya ([employer]) Case ID: [id number]    [id number]    [phone]  [employer] Electronic PA Form (2017 NCPDP)   Use for [employer] Prior Authorizations  Waiting for Payer Response   3/25/2025  8:15 AM  Deadline to reply: March 24, 2026  1:32 PM Sending user: Romy Turpin   Payer: Kathya ([employer]) Case ID: [id number]    [phone]    [phone]  [employer] Electronic PA Form (2017 NCPDP)   Use for [employer] Prior Authorizations  Waiting for Payer Response   3/24/2025  1:31 PM  Sending user: Arnaud Plunkett DO   Payer: [employer] Patient's Payer    [phone]

## 2025-03-25 NOTE — TELEPHONE ENCOUNTER
Spoke with pt - Patient expressed understanding  Patient states he would like medication sent to [facility].

## 2025-03-27 LAB — SHBG SERPL-SCNC: 67 NMOL/L (ref 19–76)

## 2025-04-04 ENCOUNTER — TELEPHONE (OUTPATIENT)
Age: 85
End: 2025-04-04

## 2025-04-14 ENCOUNTER — TELEPHONE (OUTPATIENT)
Age: 85
End: 2025-04-14

## 2025-04-14 NOTE — TELEPHONE ENCOUNTER
Patient states his blood pressure has been running around 155 over 99/101 for the last two days. He is inquiring if this could be from the antibiotic he was prescribed.

## 2025-04-16 DIAGNOSIS — M48.061 SPINAL STENOSIS OF LUMBAR REGION AT MULTIPLE LEVELS: ICD-10-CM

## 2025-04-16 RX ORDER — VALSARTAN 80 MG/1
80 TABLET ORAL DAILY
Qty: 90 TABLET | Refills: 1 | Status: SHIPPED | OUTPATIENT
Start: 2025-04-16

## 2025-04-16 RX ORDER — CYCLOBENZAPRINE HCL 10 MG
TABLET ORAL
Qty: 30 TABLET | Refills: 0 | Status: SHIPPED | OUTPATIENT
Start: 2025-04-16

## 2025-04-16 NOTE — TELEPHONE ENCOUNTER
Prior Authorization History  cyclobenzaprine (FLEXERIL) 10 MG tablet     History  PA Detail   View all authorizations for this medication  Closed   4/16/2025 12:08 PM  Close reason: Prior Authorization not required for patient/medication   Note from payer: [facility] has predicted that this prior auth will not be required.   Payer: [employer] Patient's Payer Case ID: [id number]    [id number]  Waiting for Payer Response   4/16/2025 12:08 PM  Sending user: Arnaud Plunkett DO   Payer: [employer] Patient's Payer    [phone]

## 2025-04-16 NOTE — TELEPHONE ENCOUNTER
Last Office Visit:   3/24/2025    Next Visit Date:  Future Appointments   Date Time Provider Department Center   4/30/2025  3:00 PM Arnaud Plunkett DO [facility]

## 2025-04-21 ENCOUNTER — TELEPHONE (OUTPATIENT)
Age: 85
End: 2025-04-21

## 2025-04-21 NOTE — TELEPHONE ENCOUNTER
Patient called requesting his blood work orders to be faxed to [facility]. He wants to have them done before his appt on 4/30.

## 2025-04-24 DIAGNOSIS — M48.061 SPINAL STENOSIS OF LUMBAR REGION AT MULTIPLE LEVELS: ICD-10-CM

## 2025-04-24 RX ORDER — NYSTATIN 100000 [USP'U]/ML
SUSPENSION ORAL
Qty: 480 ML | Refills: 0 | Status: SHIPPED | OUTPATIENT
Start: 2025-04-24

## 2025-04-24 RX ORDER — MORPHINE SULFATE 30 MG/1
30 TABLET, FILM COATED, EXTENDED RELEASE ORAL EVERY 12 HOURS
Qty: 60 TABLET | Refills: 0 | Status: SHIPPED | OUTPATIENT
Start: 2025-04-24 | End: 2025-05-24

## 2025-04-28 LAB
ESTIMATED AVERAGE GLUCOSE: NORMAL
HBA1C MFR BLD: 6.2 %

## 2025-04-30 ENCOUNTER — OFFICE VISIT (OUTPATIENT)
Age: 85
End: 2025-04-30
Payer: MEDICARE

## 2025-04-30 VITALS
HEART RATE: 94 BPM | BODY MASS INDEX: 29.26 KG/M2 | OXYGEN SATURATION: 95 % | SYSTOLIC BLOOD PRESSURE: 132 MMHG | WEIGHT: 216 LBS | DIASTOLIC BLOOD PRESSURE: 62 MMHG | TEMPERATURE: 98.6 F | HEIGHT: 72 IN

## 2025-04-30 DIAGNOSIS — N39.0 RECURRENT UTI: ICD-10-CM

## 2025-04-30 DIAGNOSIS — J84.9 INTERSTITIAL LUNG DISEASE (HCC): ICD-10-CM

## 2025-04-30 DIAGNOSIS — M48.061 SPINAL STENOSIS OF LUMBAR REGION AT MULTIPLE LEVELS: Primary | ICD-10-CM

## 2025-04-30 DIAGNOSIS — E29.1 MALE HYPOGONADISM: ICD-10-CM

## 2025-04-30 DIAGNOSIS — R25.2 MUSCLE CRAMPING: ICD-10-CM

## 2025-04-30 LAB
BILIRUBIN, POC: NORMAL
BLOOD URINE, POC: NORMAL
CLARITY, POC: CLEAR
COLOR, POC: YELLOW
GLUCOSE URINE, POC: NORMAL MG/DL
KETONES, POC: NORMAL MG/DL
LEUKOCYTE EST, POC: NORMAL
NITRITE, POC: NORMAL
PH, POC: 6.5
PROTEIN, POC: NORMAL MG/DL
SPECIFIC GRAVITY, POC: 1.01
UROBILINOGEN, POC: 0.2 MG/DL

## 2025-04-30 PROCEDURE — 1123F ACP DISCUSS/DSCN MKR DOCD: CPT | Performed by: FAMILY MEDICINE

## 2025-04-30 PROCEDURE — 1159F MED LIST DOCD IN RCRD: CPT | Performed by: FAMILY MEDICINE

## 2025-04-30 PROCEDURE — 81003 URINALYSIS AUTO W/O SCOPE: CPT | Performed by: FAMILY MEDICINE

## 2025-04-30 PROCEDURE — 99214 OFFICE O/P EST MOD 30 MIN: CPT | Performed by: FAMILY MEDICINE

## 2025-04-30 PROCEDURE — 1160F RVW MEDS BY RX/DR IN RCRD: CPT | Performed by: FAMILY MEDICINE

## 2025-04-30 RX ORDER — DOXYCYCLINE HYCLATE 100 MG
100 TABLET ORAL 2 TIMES DAILY
Qty: 20 TABLET | Refills: 0 | Status: SHIPPED | OUTPATIENT
Start: 2025-04-30 | End: 2025-05-10

## 2025-04-30 RX ORDER — METHYLPREDNISOLONE 4 MG/1
TABLET ORAL
Qty: 1 KIT | Refills: 0 | Status: SHIPPED | OUTPATIENT
Start: 2025-04-30

## 2025-04-30 RX ORDER — MAGNESIUM 64 MG (MAGNESIUM CHLORIDE) TABLET,DELAYED RELEASE
2 2 TIMES DAILY WITH MEALS
Qty: 360 TABLET | Refills: 3 | Status: SHIPPED | OUTPATIENT
Start: 2025-04-30

## 2025-04-30 NOTE — PROGRESS NOTES
allopurinol (ZYLOPRIM) 300 MG tablet TAKE 1 TABLET BY MOUTH EVERY DAY 2/13/25   Arnaud Plunkett DO   furosemide (LASIX) 40 MG tablet TAKE 1 TABLET BY MOUTH DAILY 1/28/25   Arnaud Plunkett DO   nystatin (MYCOSTATIN) 413614 UNIT/GM cream APPLY TO THE AFFECTED AREA(S) TWICE DAILY AS DIRECTED 1/27/25   Arnaud Plunkett DO   DULoxetine (CYMBALTA) 60 MG extended release capsule Take 1 capsule by mouth daily 1/27/25   Arnaud Plunkett DO   diclofenac (VOLTAREN) 75 MG EC tablet Take 1 tablet by mouth 2 times daily 1/27/25   Arnaud Plunkett DO   hydrOXYzine HCl (ATARAX) 25 MG tablet Take 1 tablet by mouth 4 times a day as needed for anxiety 1/13/25   Arnaud Plunkett DO   guanFACINE HCl 2 MG TABS Take 1 tablet by mouth nightly 11/4/24   Arnaud Plunkett DO   promethazine (PHENERGAN) 25 MG tablet Take 1 tablet by mouth 4 times daily as needed for Nausea 10/30/24   Arnaud Plunkett DO   albuterol (PROVENTIL) (2.5 MG/3ML) 0.083% nebulizer solution inhale 1 vial via nebulizer EVERY 4 HOURS AS NEEDED FOR WHEEZING 4/29/24   Arnaud Plunkett DO   B-D 3CC LUER-MEGHA SYR 22GX1\" 22G X 1\" 3 ML MISC USE AS DIRECTED EVERY WEEK 12/22/23   Tommy Garcia MD   iron polysaccharides (NIFEREX) 150 MG capsule Take 1 capsule by mouth daily    Tommy Garcia MD   Albuterol-Budesonide 90-80 MCG/ACT AERO Inhale into the lungs    Tommy Garcia MD   hydrocortisone (CORTEF) 10 MG tablet  6/28/23   Tommy Garcia MD   SYNTHROID 150 MCG tablet  7/20/23   Tommy Garcia MD   tamsulosin (FLOMAX) 0.4 MG capsule Take 1 capsule by mouth 2 times daily 7/20/23   Tommy Garcia MD   Multiple Vitamins-Minerals (MULTIVITAMIN ADULT EXTRA C PO) Take 1 tablet by mouth daily    Tommy Garcia MD   Calcium Carbonate-Vitamin D (OYSTER SHELL CALCIUM/D) 500-5 MG-MCG TABS Take 500 tablets by mouth daily    Tommy Garcia MD   omeprazole (PRILOSEC) 20 MG delayed release capsule Take 1 capsule by mouth

## 2025-05-05 DIAGNOSIS — F41.1 GENERALIZED ANXIETY DISORDER: Primary | ICD-10-CM

## 2025-05-05 RX ORDER — GUANFACINE 2 MG/1
1 TABLET ORAL NIGHTLY
Qty: 30 TABLET | Refills: 5 | Status: SHIPPED | OUTPATIENT
Start: 2025-05-05

## 2025-05-19 DIAGNOSIS — F41.1 GENERALIZED ANXIETY DISORDER: Primary | ICD-10-CM

## 2025-05-19 DIAGNOSIS — M48.061 SPINAL STENOSIS OF LUMBAR REGION AT MULTIPLE LEVELS: ICD-10-CM

## 2025-05-19 RX ORDER — PREGABALIN 50 MG/1
50 CAPSULE ORAL 3 TIMES DAILY
Qty: 270 CAPSULE | Refills: 0 | Status: SHIPPED | OUTPATIENT
Start: 2025-05-19 | End: 2025-08-17

## 2025-05-20 RX ORDER — HYDROXYZINE HYDROCHLORIDE 25 MG/1
TABLET, FILM COATED ORAL
Qty: 90 TABLET | Refills: 3 | Status: SHIPPED | OUTPATIENT
Start: 2025-05-20

## 2025-05-23 DIAGNOSIS — M48.061 SPINAL STENOSIS OF LUMBAR REGION AT MULTIPLE LEVELS: ICD-10-CM

## 2025-05-23 RX ORDER — CYCLOBENZAPRINE HCL 10 MG
TABLET ORAL
Qty: 30 TABLET | Refills: 0 | Status: SHIPPED | OUTPATIENT
Start: 2025-05-23

## 2025-05-23 RX ORDER — MORPHINE SULFATE 30 MG/1
30 TABLET, FILM COATED, EXTENDED RELEASE ORAL EVERY 12 HOURS
Qty: 60 TABLET | Refills: 0 | Status: SHIPPED | OUTPATIENT
Start: 2025-05-23 | End: 2025-06-22

## 2025-05-27 DIAGNOSIS — M48.061 SPINAL STENOSIS OF LUMBAR REGION AT MULTIPLE LEVELS: ICD-10-CM

## 2025-05-27 RX ORDER — PREGABALIN 50 MG/1
50 CAPSULE ORAL 3 TIMES DAILY
Qty: 270 CAPSULE | Refills: 0 | Status: SHIPPED | OUTPATIENT
Start: 2025-05-27 | End: 2025-08-25

## 2025-06-17 ENCOUNTER — RESULTS FOLLOW-UP (OUTPATIENT)
Age: 85
End: 2025-06-17

## 2025-06-25 DIAGNOSIS — M48.061 SPINAL STENOSIS OF LUMBAR REGION AT MULTIPLE LEVELS: ICD-10-CM

## 2025-06-25 NOTE — TELEPHONE ENCOUNTER
Patient's Last Office Visit  4/30/2025     Patient's Next Visit  Future Appointments   Date Time Provider Department Center   7/30/2025  2:30 PM Arnaud Plunkett DO [facility] DE[facility]

## 2025-06-26 RX ORDER — CYCLOBENZAPRINE HCL 10 MG
TABLET ORAL
Qty: 30 TABLET | Refills: 0 | Status: SHIPPED | OUTPATIENT
Start: 2025-06-26

## 2025-06-26 RX ORDER — MORPHINE SULFATE 30 MG/1
30 TABLET, FILM COATED, EXTENDED RELEASE ORAL EVERY 12 HOURS
Qty: 60 TABLET | Refills: 0 | Status: SHIPPED | OUTPATIENT
Start: 2025-06-26 | End: 2025-07-26

## 2025-06-27 ENCOUNTER — TELEPHONE (OUTPATIENT)
Age: 85
End: 2025-06-27

## 2025-06-27 DIAGNOSIS — M48.061 SPINAL STENOSIS OF LUMBAR REGION AT MULTIPLE LEVELS: ICD-10-CM

## 2025-06-27 RX ORDER — MORPHINE SULFATE 30 MG/1
30 CAPSULE, EXTENDED RELEASE ORAL 2 TIMES DAILY
Qty: 60 CAPSULE | Refills: 0 | Status: SHIPPED | OUTPATIENT
Start: 2025-06-27 | End: 2025-07-27

## 2025-06-27 NOTE — TELEPHONE ENCOUNTER
PT called regarding morphine (MS CONTIN) 30 MG extended release tablet     The pharmacy does not have tablets and is requesting RX for capsules

## 2025-07-14 DIAGNOSIS — J84.9 INTERSTITIAL LUNG DISEASE (HCC): Primary | ICD-10-CM

## 2025-07-14 RX ORDER — ALBUTEROL SULFATE 0.83 MG/ML
SOLUTION RESPIRATORY (INHALATION)
Qty: 120 EACH | Refills: 5 | Status: SHIPPED | OUTPATIENT
Start: 2025-07-14

## 2025-07-14 NOTE — TELEPHONE ENCOUNTER
Pt is having a hard time breathing from standing to sitting and would like a refill on albuterol.     NV 07/30/25

## 2025-07-22 ENCOUNTER — TELEPHONE (OUTPATIENT)
Age: 85
End: 2025-07-22

## 2025-07-22 DIAGNOSIS — Z12.5 SPECIAL SCREENING FOR MALIGNANT NEOPLASM OF PROSTATE: Primary | ICD-10-CM

## 2025-07-22 DIAGNOSIS — E29.1 MALE HYPOGONADISM: ICD-10-CM

## 2025-07-22 DIAGNOSIS — E11.9 TYPE 2 DIABETES MELLITUS WITHOUT COMPLICATION, WITHOUT LONG-TERM CURRENT USE OF INSULIN (HCC): ICD-10-CM

## 2025-07-22 DIAGNOSIS — E78.2 MIXED HYPERLIPIDEMIA: ICD-10-CM

## 2025-07-22 DIAGNOSIS — I10 BENIGN HYPERTENSION: ICD-10-CM

## 2025-07-22 DIAGNOSIS — E03.8 OTHER SPECIFIED HYPOTHYROIDISM: ICD-10-CM

## 2025-07-22 NOTE — TELEPHONE ENCOUNTER
Patient called stating he has an appointment with PCP next week and knows he has to have some blood work done including testosterone. He would like those labs to be sent to [facility] and he will go have his blood drawn once the labs are ordered.

## 2025-07-24 LAB
ESTIMATED AVERAGE GLUCOSE: NORMAL
HBA1C MFR BLD: 5.5 %

## 2025-07-27 DIAGNOSIS — M48.061 SPINAL STENOSIS OF LUMBAR REGION AT MULTIPLE LEVELS: ICD-10-CM

## 2025-07-28 DIAGNOSIS — M48.061 SPINAL STENOSIS OF LUMBAR REGION AT MULTIPLE LEVELS: ICD-10-CM

## 2025-07-28 RX ORDER — MORPHINE SULFATE 30 MG/1
30 CAPSULE, EXTENDED RELEASE ORAL 2 TIMES DAILY
Qty: 60 CAPSULE | Refills: 0 | Status: SHIPPED | OUTPATIENT
Start: 2025-07-28 | End: 2025-07-30 | Stop reason: SDUPTHER

## 2025-07-28 RX ORDER — DICLOFENAC SODIUM 75 MG/1
75 TABLET, DELAYED RELEASE ORAL 2 TIMES DAILY
Qty: 180 TABLET | Refills: 1 | Status: SHIPPED | OUTPATIENT
Start: 2025-07-28

## 2025-07-28 RX ORDER — DULOXETIN HYDROCHLORIDE 60 MG/1
60 CAPSULE, DELAYED RELEASE ORAL DAILY
Qty: 90 CAPSULE | Refills: 1 | Status: SHIPPED | OUTPATIENT
Start: 2025-07-28

## 2025-07-28 RX ORDER — CYCLOBENZAPRINE HCL 10 MG
TABLET ORAL
Qty: 30 TABLET | Refills: 0 | Status: SHIPPED | OUTPATIENT
Start: 2025-07-28

## 2025-07-28 RX ORDER — POTASSIUM CHLORIDE 750 MG/1
10 CAPSULE, EXTENDED RELEASE ORAL 2 TIMES DAILY
Qty: 180 CAPSULE | Refills: 1 | Status: SHIPPED | OUTPATIENT
Start: 2025-07-28

## 2025-07-29 ENCOUNTER — TELEPHONE (OUTPATIENT)
Age: 85
End: 2025-07-29

## 2025-07-29 NOTE — TELEPHONE ENCOUNTER
Pharmacist called to let Dr Plunkett know that they only dispensed 40 of the 60 pills ordered for this pt due to the morphine being on back order.  Just an FYI.

## 2025-07-30 ENCOUNTER — OFFICE VISIT (OUTPATIENT)
Age: 85
End: 2025-07-30
Payer: MEDICARE

## 2025-07-30 VITALS
DIASTOLIC BLOOD PRESSURE: 62 MMHG | SYSTOLIC BLOOD PRESSURE: 130 MMHG | WEIGHT: 213 LBS | HEIGHT: 72 IN | BODY MASS INDEX: 28.85 KG/M2 | OXYGEN SATURATION: 92 % | TEMPERATURE: 97.8 F | HEART RATE: 110 BPM

## 2025-07-30 DIAGNOSIS — E27.1 ADDISON'S DISEASE (HCC): ICD-10-CM

## 2025-07-30 DIAGNOSIS — E03.8 OTHER SPECIFIED HYPOTHYROIDISM: Primary | ICD-10-CM

## 2025-07-30 DIAGNOSIS — F41.1 GENERALIZED ANXIETY DISORDER: ICD-10-CM

## 2025-07-30 DIAGNOSIS — J84.9 INTERSTITIAL LUNG DISEASE (HCC): ICD-10-CM

## 2025-07-30 DIAGNOSIS — M48.061 SPINAL STENOSIS OF LUMBAR REGION AT MULTIPLE LEVELS: ICD-10-CM

## 2025-07-30 PROCEDURE — 1160F RVW MEDS BY RX/DR IN RCRD: CPT | Performed by: FAMILY MEDICINE

## 2025-07-30 PROCEDURE — 1123F ACP DISCUSS/DSCN MKR DOCD: CPT | Performed by: FAMILY MEDICINE

## 2025-07-30 PROCEDURE — 1159F MED LIST DOCD IN RCRD: CPT | Performed by: FAMILY MEDICINE

## 2025-07-30 PROCEDURE — 99214 OFFICE O/P EST MOD 30 MIN: CPT | Performed by: FAMILY MEDICINE

## 2025-07-30 RX ORDER — INHALER, ASSIST DEVICES
1 SPACER (EA) MISCELLANEOUS DAILY PRN
Qty: 1 EACH | Refills: 3 | Status: SHIPPED | OUTPATIENT
Start: 2025-07-30

## 2025-07-30 RX ORDER — ALBUTEROL SULFATE 90 UG/1
2 INHALANT RESPIRATORY (INHALATION) 4 TIMES DAILY PRN
Qty: 18 G | Refills: 3 | Status: SHIPPED | OUTPATIENT
Start: 2025-07-30

## 2025-07-30 RX ORDER — MORPHINE SULFATE 30 MG/1
30 CAPSULE, EXTENDED RELEASE ORAL 2 TIMES DAILY
Qty: 20 CAPSULE | Refills: 0 | Status: SHIPPED | OUTPATIENT
Start: 2025-07-30 | End: 2025-08-09

## 2025-07-30 NOTE — PROGRESS NOTES
Dom Grigsby (:  1940) is a 84 y.o. male, Established patient, here for evaluation of the following chief complaint(s):  No chief complaint on file.          Subjective   History of Present Illness  The patient presents for breathing difficulties, internal shakiness, and medication management.    He reports a decline in health over the past 3 months, with worsening breathing difficulties and mobility issues. He has been spending more time sitting and is not currently undergoing any therapy. He uses a nebulizer and an inhaler for his respiratory issues.    He also mentions experiencing internal tremors, which he attributes to his thyroid condition. His last meal was at noon today. He is on Synthroid 150 mcg daily, which was reduced from 1.5 tablets on Sundays to 1 tablet daily about 3 months ago. He continues to see Dr. Faulkner for his thyroid condition.    He was unable to refill his full morphine prescription due to a shortage at the pharmacy. He was given 40 tablets but needs an additional 20. He is uncertain if his insurance will cover the remaining 20 tablets. He is on testosterone supplements.    Social History:  Diet: He drinks Coke and Sprite.    History reviewed. No pertinent past medical history.  History reviewed. No pertinent surgical history.  Social History     Socioeconomic History    Marital status:      Spouse name: Not on file    Number of children: Not on file    Years of education: Not on file    Highest education level: Not on file   Occupational History    Not on file   Tobacco Use    Smoking status: Never    Smokeless tobacco: Never   Vaping Use    Vaping status: Never Used   Substance and Sexual Activity    Alcohol use: Never    Drug use: Never    Sexual activity: Not on file   Other Topics Concern    Not on file   Social History Narrative    Not on file     Social Drivers of Health     Financial Resource Strain: Low Risk  (10/18/2024)    Overall Financial Resource Strain

## 2025-08-04 RX ORDER — FUROSEMIDE 40 MG/1
40 TABLET ORAL DAILY
Qty: 60 TABLET | Refills: 0 | Status: SHIPPED | OUTPATIENT
Start: 2025-08-04

## 2025-08-14 ENCOUNTER — TELEPHONE (OUTPATIENT)
Age: 85
End: 2025-08-14

## 2025-08-14 DIAGNOSIS — M48.061 SPINAL STENOSIS OF LUMBAR REGION AT MULTIPLE LEVELS: ICD-10-CM

## 2025-08-14 RX ORDER — MORPHINE SULFATE 30 MG/1
30 TABLET, FILM COATED, EXTENDED RELEASE ORAL EVERY 12 HOURS
Qty: 60 TABLET | Refills: 0 | Status: SHIPPED | OUTPATIENT
Start: 2025-08-14 | End: 2025-09-13

## 2025-08-25 DIAGNOSIS — M48.061 SPINAL STENOSIS OF LUMBAR REGION AT MULTIPLE LEVELS: ICD-10-CM

## 2025-08-25 RX ORDER — PREGABALIN 50 MG/1
50 CAPSULE ORAL 3 TIMES DAILY
Qty: 270 CAPSULE | Refills: 0 | Status: SHIPPED | OUTPATIENT
Start: 2025-08-25 | End: 2025-11-23

## 2025-09-02 DIAGNOSIS — M48.061 SPINAL STENOSIS OF LUMBAR REGION AT MULTIPLE LEVELS: ICD-10-CM

## 2025-09-02 RX ORDER — CYCLOBENZAPRINE HCL 10 MG
TABLET ORAL
Qty: 90 TABLET | Refills: 1 | Status: SHIPPED | OUTPATIENT
Start: 2025-09-02

## (undated) DEVICE — SPONGE, NEURO, 3/4 X 3/4IN, STERILE, LF

## (undated) DEVICE — TUBING, CLEAR N-COND, 5MM X 10, LF

## (undated) DEVICE — WOUND, EVAC, 400ML, POUCHED W/1/8`` TUBE W/TROCAR

## (undated) DEVICE — CATHETER, IV, ANGIOCATH, 14 G X 5.25 IN, FEP POLYMER

## (undated) DEVICE — SLEEVE, VASO PRESS, CALF GARMENT, MEDIUM, GREEN

## (undated) DEVICE — SPONGE, HEMOSTATIC, GELATIN, SURGIFOAM, 8 X 12.5 CM X 10 MM

## (undated) DEVICE — TIP, SUCTION, FRAZIER, 12 FR

## (undated) DEVICE — CUP, MEDICINE, GRADUATED, 2 OZ, PLASTIC, DISP, LF

## (undated) DEVICE — BAG, BANDED, 36IN X 28IN

## (undated) DEVICE — STRIP, SKIN CLOSURE, STERI STRIP, REINFORCED, 0.5 X 4 IN

## (undated) DEVICE — DRAPE, INCISE, ANTIMICROBIAL, IOBAN 2, LARGE, 17 X 23 IN, DISPOSABLE, STERILE

## (undated) DEVICE — DRESSING, TRANSPARENT, TEGADERM, 2-3/8 X 2-3/4 IN

## (undated) DEVICE — WOUND SYSTEM, DEBRIDEMENT & CLEANING, O.R DUOPAK

## (undated) DEVICE — CORD, CAUTERY, BIOPOLAR FORCEP, 12FT

## (undated) DEVICE — SPONGE, GAUZE, XRAY DECT, 16 PLY, 4 X 8, W/MASTER DMT,STERILE

## (undated) DEVICE — MARKER, SKIN, REGULAR TIP, W/FLEXI-RULER

## (undated) DEVICE — SYRINGE, 20 CC, LUER LOCK

## (undated) DEVICE — BASIN, EMESIS, STANDARD, STERILE

## (undated) DEVICE — Device

## (undated) DEVICE — GLIDESCOPE BLADE, SPECTRUM SU, LOPRO S4

## (undated) DEVICE — CARE KIT, PATIENT, LPV ARM CRADLE RWF, WILSON FRAME

## (undated) DEVICE — SPONGE, NEURO, 1/2 X 1/2IN, STERILE, LF

## (undated) DEVICE — BUR, 5.0MM, ROUND, AGGRESSIVE, FLUTED, LF

## (undated) DEVICE — CATHETER KIT, RADIAL ARTLINE, 20G X 1 3/4

## (undated) DEVICE — DRAPE, INSTRUMENT, W/POUCH, STERI DRAPE, 7 X 11 IN, DISPOSABLE, STERILE

## (undated) DEVICE — DRESSING, TRANSPARENT, TEGADERM, 4 X 4-3/4 IN, NO LABEL

## (undated) DEVICE — BONE WAX, 2.5 GRAMS

## (undated) DEVICE — NEEDLE, HYPODERMIC, SAFETYGLIDE, SHIELDING, REGULAR WALL, REGULAR BEVEL, 22 G X 1.5 IN, BLACK HUB

## (undated) DEVICE — COVER, BACK TABLE

## (undated) DEVICE — COAGULATOR, HANDSWITCHING, SUCTION

## (undated) DEVICE — CATHETER TRAY, SURESTEP, 16FR, URINE METER W/STATLOCK

## (undated) DEVICE — APPLICATOR, CHLORAPREP, W/ORANGE TINT, 26ML

## (undated) DEVICE — SYRINGE, 10 CC, LUER LOCK

## (undated) DEVICE — DRAPE, SHEET, THREE QUARTER, FAN FOLD, 57 X 77 IN

## (undated) DEVICE — DRESSING, TELFA, 3X4